# Patient Record
Sex: MALE | Race: BLACK OR AFRICAN AMERICAN | NOT HISPANIC OR LATINO | Employment: OTHER | URBAN - METROPOLITAN AREA
[De-identification: names, ages, dates, MRNs, and addresses within clinical notes are randomized per-mention and may not be internally consistent; named-entity substitution may affect disease eponyms.]

---

## 2017-01-24 RX ORDER — LISINOPRIL 10 MG/1
10 TABLET ORAL DAILY
Qty: 30 TABLET | Refills: 0 | Status: SHIPPED | OUTPATIENT
Start: 2017-01-24 | End: 2017-01-26 | Stop reason: SDUPTHER
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
## 2017-01-24 NOTE — TELEPHONE ENCOUNTER
Pt informed that he needs FU appt for additional refills and sent Lisinopril to pharmacy in [place], [place]

## 2017-01-26 ENCOUNTER — TELEPHONE (OUTPATIENT)
Dept: FAMILY MEDICINE CLINIC | Facility: CLINIC | Age: 47
End: 2017-01-26

## 2017-01-26 RX ORDER — LISINOPRIL 10 MG/1
10 TABLET ORAL DAILY
Qty: 30 TABLET | Refills: 0 | Status: SHIPPED | OUTPATIENT
Start: 2017-01-26 | End: 2017-02-15 | Stop reason: DRUGHIGH

## 2017-01-26 NOTE — TELEPHONE ENCOUNTER
----- Message from Evelyn Palomo sent at 1/26/2017 10:45 AM EST -----  Contact: [phone]  THEY DIDN'T MAKE IT TO THE [place] [facility] AND THEY WANT TO KNOW IF THEY CAN CHANGE THE PHARMACY TO ONE IN [place].     [place].      lisinopril (PRINIVIL,ZESTRIL) 10 MG tablet 30 tablet     Sig - Route: Take 1 tablet by mouth Daily. One PO daily for BP       informed pt [facility] Lisinopril  [facility] [place]

## 2017-02-14 ENCOUNTER — TELEPHONE (OUTPATIENT)
Dept: FAMILY MEDICINE CLINIC | Facility: CLINIC | Age: 47
End: 2017-02-14

## 2017-02-14 NOTE — TELEPHONE ENCOUNTER
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 Can you plz give this pt a call

## 2017-02-15 ENCOUNTER — OFFICE VISIT (OUTPATIENT)
Dept: FAMILY MEDICINE CLINIC | Facility: CLINIC | Age: 47
End: 2017-02-15

## 2017-02-15 VITALS
DIASTOLIC BLOOD PRESSURE: 86 MMHG | OXYGEN SATURATION: 98 % | BODY MASS INDEX: 34.01 KG/M2 | WEIGHT: 265 LBS | HEART RATE: 104 BPM | TEMPERATURE: 98.5 F | SYSTOLIC BLOOD PRESSURE: 166 MMHG | HEIGHT: 74 IN

## 2017-02-15 DIAGNOSIS — Z72.0 TOBACCO ABUSE: ICD-10-CM

## 2017-02-15 DIAGNOSIS — R73.9 HYPERGLYCEMIA: ICD-10-CM

## 2017-02-15 DIAGNOSIS — E66.09 NON MORBID OBESITY DUE TO EXCESS CALORIES: ICD-10-CM

## 2017-02-15 DIAGNOSIS — I10 ESSENTIAL HYPERTENSION: Primary | ICD-10-CM

## 2017-02-15 PROCEDURE — 99213 OFFICE O/P EST LOW 20 MIN: CPT | Performed by: NURSE PRACTITIONER

## 2017-02-15 RX ORDER — LISINOPRIL 20 MG/1
20 TABLET ORAL DAILY
Qty: 30 TABLET | Refills: 1 | Status: SHIPPED | OUTPATIENT
Start: 2017-02-15 | End: 2017-03-21 | Stop reason: SDUPTHER

## 2017-02-15 NOTE — PROGRESS NOTES
Subjective   Marlo Merrill is a 46 y.o. male.     History of Present Illness   Here to FU on HTN on lisinopril 10 mg daily no missed doses, with cont elevated -160s, no CP dizziness HA LE edema, with fam hx significant for heart disease, works as long distance  and working on healthy food choices, still smoking but has decreased amt now 1/2 ppd on ecig 0 nicotine, was prev erx chantix but didn't start scared of possible SE, thinks will have insurance in another 6 wks with last labs CMP 12/16 glu 122 otherwise normal  BP recheck manual R arm 150/96    The following portions of the patient's history were reviewed and updated as appropriate: allergies, current medications, past family history, past medical history, past social history, past surgical history and problem list.    Review of Systems   Constitutional: Negative for fever.   Respiratory: Negative for cough, shortness of breath and wheezing.    Cardiovascular: Negative for chest pain, palpitations and leg swelling.   Neurological: Negative for dizziness and headaches.   All other systems reviewed and are negative.      Objective   Physical Exam   Constitutional: He is oriented to person, place, and time. He appears well-developed and well-nourished.   HENT:   Head: Normocephalic and atraumatic.   Eyes: Conjunctivae and EOM are normal. Pupils are equal, round, and reactive to light.   Cardiovascular: Normal rate, regular rhythm and normal heart sounds.    Pulmonary/Chest: Effort normal and breath sounds normal.   Musculoskeletal: Normal range of motion.   Neurological: He is alert and oriented to person, place, and time.   Skin: Skin is warm and dry.   Psychiatric: He has a normal mood and affect. His behavior is normal. Judgment and thought content normal.   Vitals reviewed.      Assessment/Plan   Marlo was seen today for follow-up and hypertension.    Diagnoses and all orders for this visit:    Essential hypertension    Non morbid obesity  due to excess calories    Tobacco abuse    Hyperglycemia    Other orders  -     lisinopril (PRINIVIL,ZESTRIL) 20 MG tablet; Take 1 tablet by mouth Daily.    increase lisinopril 20 mg daily and check BP at home, log and FU 1 mo, pt should get insurance 04/17 and we will do full physical then EKG CXR full labs, enc DASH diet and gave handout, enc regular exercise to help with weight loss and BS control, enc cessation

## 2017-02-15 NOTE — PATIENT INSTRUCTIONS
"increase lisinopril 20 mg daily and check BP at home, log and FU 1 mo, pt should get insurance 04/17 and we will do full physical then EKG CXR full labs, enc DASH diet and gave handout, enc regular exercise to help with weight loss and BS control, enc cessation  DASH Eating Plan  DASH stands for \"Dietary Approaches to Stop Hypertension.\" The DASH eating plan is a healthy eating plan that has been shown to reduce high blood pressure (hypertension). Additional health benefits may include reducing the risk of type 2 diabetes mellitus, heart disease, and stroke. The DASH eating plan may also help with weight loss.  WHAT DO I NEED TO KNOW ABOUT THE DASH EATING PLAN?  For the DASH eating plan, you will follow these general guidelines:  · Choose foods with a percent daily value for sodium of less than 5% (as listed on the food label).  · Use salt-free seasonings or herbs instead of table salt or sea salt.  · Check with your health care provider or pharmacist before using salt substitutes.  · Eat lower-sodium products, often labeled as \"lower sodium\" or \"no salt added.\"  · Eat fresh foods.  · Eat more vegetables, fruits, and low-fat dairy products.  · Choose whole grains. Look for the word \"whole\" as the first word in the ingredient list.  · Choose fish and skinless chicken or turkey more often than red meat. Limit fish, poultry, and meat to 6 oz (170 g) each day.  · Limit sweets, desserts, sugars, and sugary drinks.  · Choose heart-healthy fats.  · Limit cheese to 1 oz (28 g) per day.  · Eat more home-cooked food and less restaurant, buffet, and fast food.  · Limit fried foods.  · Cook foods using methods other than frying.  · Limit canned vegetables. If you do use them, rinse them well to decrease the sodium.  · When eating at a restaurant, ask that your food be prepared with less salt, or no salt if possible.  WHAT FOODS CAN I EAT?  Seek help from a dietitian for individual calorie needs.  Grains  Whole grain or whole " wheat bread. Brown rice. Whole grain or whole wheat pasta. Quinoa, bulgur, and whole grain cereals. Low-sodium cereals. Corn or whole wheat flour tortillas. Whole grain cornbread. Whole grain crackers. Low-sodium crackers.  Vegetables  Fresh or frozen vegetables (raw, steamed, roasted, or grilled). Low-sodium or reduced-sodium tomato and vegetable juices. Low-sodium or reduced-sodium tomato sauce and paste. Low-sodium or reduced-sodium canned vegetables.   Fruits  All fresh, canned (in natural juice), or frozen fruits.  Meat and Other Protein Products  Ground beef (85% or leaner), grass-fed beef, or beef trimmed of fat. Skinless chicken or turkey. Ground chicken or turkey. Pork trimmed of fat. All fish and seafood. Eggs. Dried beans, peas, or lentils. Unsalted nuts and seeds. Unsalted canned beans.  Dairy  Low-fat dairy products, such as skim or 1% milk, 2% or reduced-fat cheeses, low-fat ricotta or cottage cheese, or plain low-fat yogurt. Low-sodium or reduced-sodium cheeses.  Fats and Oils  Tub margarines without trans fats. Light or reduced-fat mayonnaise and salad dressings (reduced sodium). Avocado. Safflower, olive, or canola oils. Natural peanut or almond butter.  Other  Unsalted popcorn and pretzels.  The items listed above may not be a complete list of recommended foods or beverages. Contact your dietitian for more options.  WHAT FOODS ARE NOT RECOMMENDED?  Grains  White bread. White pasta. White rice. Refined cornbread. Bagels and croissants. Crackers that contain trans fat.  Vegetables  Creamed or fried vegetables. Vegetables in a cheese sauce. Regular canned vegetables. Regular canned tomato sauce and paste. Regular tomato and vegetable juices.  Fruits  Dried fruits. Canned fruit in light or heavy syrup. Fruit juice.  Meat and Other Protein Products  Fatty cuts of meat. Ribs, chicken wings, narvaez, sausage, bologna, salami, chitterlings, fatback, hot dogs, bratwurst, and packaged luncheon meats. Salted  nuts and seeds. Canned beans with salt.  Dairy  Whole or 2% milk, cream, half-and-half, and cream cheese. Whole-fat or sweetened yogurt. Full-fat cheeses or blue cheese. Nondairy creamers and whipped toppings. Processed cheese, cheese spreads, or cheese curds.  Condiments  Onion and garlic salt, seasoned salt, table salt, and sea salt. Canned and packaged gravies. Worcestershire sauce. Tartar sauce. Barbecue sauce. Teriyaki sauce. Soy sauce, including reduced sodium. Steak sauce. Fish sauce. Oyster sauce. Cocktail sauce. Horseradish. Ketchup and mustard. Meat flavorings and tenderizers. Bouillon cubes. Hot sauce. Tabasco sauce. Marinades. Taco seasonings. Relishes.  Fats and Oils  Butter, stick margarine, lard, shortening, ghee, and narvaez fat. Coconut, palm kernel, or palm oils. Regular salad dressings.  Other  Pickles and olives. Salted popcorn and pretzels.  The items listed above may not be a complete list of foods and beverages to avoid. Contact your dietitian for more information.  WHERE CAN I FIND MORE INFORMATION?  National Heart, [employer], and [employer] [place]: www.nhlbi.nih.gov/health/health-topics/topics/dash/     This information is not intended to replace advice given to you by your health care provider. Make sure you discuss any questions you have with your health care provider.     Document Released: 12/06/2012 Document Revised: 01/08/2016 Document Reviewed: 10/22/2014  Elsevier Interactive Patient Education ©2016 [employer]

## 2017-03-21 RX ORDER — LISINOPRIL 20 MG/1
20 TABLET ORAL DAILY
Qty: 30 TABLET | Refills: 1 | Status: SHIPPED | OUTPATIENT
Start: 2017-03-21 | End: 2017-05-10 | Stop reason: SDUPTHER

## 2017-05-10 RX ORDER — LISINOPRIL 20 MG/1
20 TABLET ORAL DAILY
Qty: 30 TABLET | Refills: 1 | Status: SHIPPED | OUTPATIENT
Start: 2017-05-10 | End: 2017-05-10 | Stop reason: SDUPTHER

## 2017-05-10 RX ORDER — LISINOPRIL 20 MG/1
20 TABLET ORAL DAILY
Qty: 30 TABLET | Refills: 1 | Status: SHIPPED | OUTPATIENT
Start: 2017-05-10 | End: 2017-08-10 | Stop reason: SDUPTHER

## 2017-06-23 ENCOUNTER — TELEPHONE (OUTPATIENT)
Dept: FAMILY MEDICINE CLINIC | Facility: CLINIC | Age: 47
End: 2017-06-23

## 2017-06-23 NOTE — TELEPHONE ENCOUNTER
PT NEEDS A REFILL ON HIS BP MEDICINE LISINOPRIL 20MG. PT WILL CALL US Monday TO LET US KNOW WHICH PHARMACY AND PT WANTS A 3 MONTH SUPPLY.

## 2017-06-27 ENCOUNTER — TELEPHONE (OUTPATIENT)
Dept: FAMILY MEDICINE CLINIC | Facility: CLINIC | Age: 47
End: 2017-06-27

## 2017-08-01 ENCOUNTER — TELEPHONE (OUTPATIENT)
Dept: FAMILY MEDICINE CLINIC | Facility: CLINIC | Age: 47
End: 2017-08-01

## 2017-08-01 NOTE — TELEPHONE ENCOUNTER
Spoke with patient. He wont need refills until next week. He will call back next week with pharmacy

## 2017-08-01 NOTE — TELEPHONE ENCOUNTER
REFILL ON lisinopril (PRINIVIL,ZESTRIL) 20 MG tablet  Take 1 tablet by mouth DaiLY    PATIENT WILL CALL BACK WITH PHARMACY IT NEEDS TO GO TO

## 2017-08-10 ENCOUNTER — TELEPHONE (OUTPATIENT)
Dept: FAMILY MEDICINE CLINIC | Facility: CLINIC | Age: 47
End: 2017-08-10

## 2017-08-10 RX ORDER — LISINOPRIL 20 MG/1
20 TABLET ORAL DAILY
Qty: 90 TABLET | Refills: 0 | Status: SHIPPED | OUTPATIENT
Start: 2017-08-10 | End: 2018-02-08 | Stop reason: SDUPTHER

## 2017-11-21 ENCOUNTER — TELEPHONE (OUTPATIENT)
Dept: FAMILY MEDICINE CLINIC | Facility: CLINIC | Age: 47
End: 2017-11-21

## 2017-11-27 ENCOUNTER — TELEPHONE (OUTPATIENT)
Dept: FAMILY MEDICINE CLINIC | Facility: CLINIC | Age: 47
End: 2017-11-27

## 2017-11-28 RX ORDER — LISINOPRIL 20 MG/1
20 TABLET ORAL DAILY
Qty: 30 TABLET | Refills: 0 | Status: SHIPPED | OUTPATIENT
Start: 2017-11-28 | End: 2018-02-08 | Stop reason: SDUPTHER

## 2018-02-07 ENCOUNTER — TELEPHONE (OUTPATIENT)
Dept: FAMILY MEDICINE CLINIC | Facility: CLINIC | Age: 48
End: 2018-02-07
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
## 2018-02-08 ENCOUNTER — TELEPHONE (OUTPATIENT)
Dept: FAMILY MEDICINE CLINIC | Facility: CLINIC | Age: 48
End: 2018-02-08

## 2018-02-08 RX ORDER — LISINOPRIL 20 MG/1
20 TABLET ORAL DAILY
Qty: 30 TABLET | Refills: 0 | Status: SHIPPED | OUTPATIENT
Start: 2018-02-08 | End: 2018-04-09 | Stop reason: SDUPTHER

## 2018-02-08 RX ORDER — LISINOPRIL 20 MG/1
20 TABLET ORAL DAILY
Qty: 30 TABLET | Refills: 0 | Status: SHIPPED | OUTPATIENT
Start: 2018-02-08 | End: 2018-03-14 | Stop reason: SDUPTHER
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
## 2018-03-13 ENCOUNTER — TELEPHONE (OUTPATIENT)
Dept: FAMILY MEDICINE CLINIC | Facility: CLINIC | Age: 48
End: 2018-03-13

## 2018-03-13 NOTE — TELEPHONE ENCOUNTER
He has had 7 cancellations and 2 no shows, he needs to make apt and keep, he is overdue fasting labs and must be seen regularly to refill HTN meds as it can hurt his kidney, ok to give 2 wk supply

## 2018-03-14 RX ORDER — LISINOPRIL 20 MG/1
20 TABLET ORAL DAILY
Qty: 14 TABLET | Refills: 0 | Status: SHIPPED | OUTPATIENT
Start: 2018-03-14 | End: 2018-04-09 | Stop reason: SDUPTHER

## 2018-04-09 ENCOUNTER — OFFICE VISIT (OUTPATIENT)
Dept: FAMILY MEDICINE CLINIC | Facility: CLINIC | Age: 48
End: 2018-04-09

## 2018-04-09 ENCOUNTER — TELEPHONE (OUTPATIENT)
Dept: FAMILY MEDICINE CLINIC | Facility: CLINIC | Age: 48
End: 2018-04-09

## 2018-04-09 VITALS
DIASTOLIC BLOOD PRESSURE: 84 MMHG | HEART RATE: 97 BPM | WEIGHT: 273 LBS | TEMPERATURE: 99 F | HEIGHT: 74 IN | BODY MASS INDEX: 35.04 KG/M2 | SYSTOLIC BLOOD PRESSURE: 118 MMHG | OXYGEN SATURATION: 96 %

## 2018-04-09 DIAGNOSIS — IMO0001 CLASS 2 OBESITY DUE TO EXCESS CALORIES WITH SERIOUS COMORBIDITY AND BODY MASS INDEX (BMI) OF 35.0 TO 35.9 IN ADULT: ICD-10-CM

## 2018-04-09 DIAGNOSIS — I10 ESSENTIAL HYPERTENSION: Primary | ICD-10-CM

## 2018-04-09 LAB
ALBUMIN SERPL-MCNC: 4.2 G/DL (ref 3.5–5.2)
ALBUMIN/GLOB SERPL: 1.3 G/DL
ALP SERPL-CCNC: 93 U/L (ref 39–117)
ALT SERPL W P-5'-P-CCNC: 23 U/L (ref 1–41)
ANION GAP SERPL CALCULATED.3IONS-SCNC: 10.5 MMOL/L
AST SERPL-CCNC: 15 U/L (ref 1–40)
BILIRUB SERPL-MCNC: 0.2 MG/DL (ref 0.1–1.2)
BUN BLD-MCNC: 12 MG/DL (ref 6–20)
BUN/CREAT SERPL: 9.9 (ref 7–25)
CALCIUM SPEC-SCNC: 9.4 MG/DL (ref 8.6–10.5)
CHLORIDE SERPL-SCNC: 104 MMOL/L (ref 98–107)
CHOLEST SERPL-MCNC: 213 MG/DL (ref 0–200)
CO2 SERPL-SCNC: 26.5 MMOL/L (ref 22–29)
CREAT BLD-MCNC: 1.21 MG/DL (ref 0.76–1.27)
GFR SERPL CREATININE-BSD FRML MDRD: 78 ML/MIN/1.73
GLOBULIN UR ELPH-MCNC: 3.3 GM/DL
GLUCOSE BLD-MCNC: 117 MG/DL (ref 65–99)
HDLC SERPL-MCNC: 28 MG/DL (ref 40–60)
LDLC SERPL CALC-MCNC: 147 MG/DL (ref 0–100)
LDLC/HDLC SERPL: 5.26 {RATIO}
POTASSIUM BLD-SCNC: 4.4 MMOL/L (ref 3.5–5.2)
PROT SERPL-MCNC: 7.5 G/DL (ref 6–8.5)
SODIUM BLD-SCNC: 141 MMOL/L (ref 136–145)
TRIGL SERPL-MCNC: 188 MG/DL (ref 0–150)
VLDLC SERPL-MCNC: 37.6 MG/DL (ref 5–40)

## 2018-04-09 PROCEDURE — 99213 OFFICE O/P EST LOW 20 MIN: CPT | Performed by: NURSE PRACTITIONER

## 2018-04-09 PROCEDURE — 80061 LIPID PANEL: CPT | Performed by: FAMILY MEDICINE

## 2018-04-09 PROCEDURE — 80053 COMPREHEN METABOLIC PANEL: CPT | Performed by: FAMILY MEDICINE

## 2018-04-09 PROCEDURE — 36415 COLL VENOUS BLD VENIPUNCTURE: CPT | Performed by: NURSE PRACTITIONER

## 2018-04-09 RX ORDER — LISINOPRIL 20 MG/1
20 TABLET ORAL DAILY
Qty: 90 TABLET | Refills: 3 | Status: SHIPPED | OUTPATIENT
Start: 2018-04-09 | End: 2018-08-09 | Stop reason: SDUPTHER

## 2018-04-09 NOTE — PATIENT INSTRUCTIONS
check labs today and call with results, refill lisinopril 20 mg daily and check BP at home, congratulated on smoking cessation, Enc healthy diet and regular exercise for wt loss, RTC next mo with insurance will get complete physical

## 2018-04-09 NOTE — TELEPHONE ENCOUNTER
BS elevated 117, recommend healthy diet and regular exercise for wt loss and BS control. We can recheck at  apt annual physical when you get insurance. Kidney and liver normal. Chol elevated  goal < 100, again recommend healthy diet and exercise and we can recheck if still high consider medication    ----- Message from Eloy De Leon MD sent at 4/9/2018 11:13 AM EDT -----      ----- Message -----  From: Lab, Background User  Sent: 4/9/2018  10:29 AM  To: Eloy De Leon MD

## 2018-04-09 NOTE — PROGRESS NOTES
Subjective   Marlo Merrill is a 47 y.o. male.     History of Present Illness   Here to FU on HTN on lisinopril 20 mg daily no CP dizziness HA LE edema, stopped smoking 01/03/18 no coughing wheezing or SOA, fasting today for labs, no regular BP checks at home, no fam hx of prostate or colon ca, working on getting insurance in next few mo    The following portions of the patient's history were reviewed and updated as appropriate: allergies, current medications, past family history, past medical history, past social history, past surgical history and problem list.    Review of Systems   Constitutional: Negative for fever.   Respiratory: Negative for cough, shortness of breath and wheezing.    Cardiovascular: Negative for chest pain, palpitations and leg swelling.   Neurological: Negative for dizziness and headaches.   All other systems reviewed and are negative.      Objective   Physical Exam   Constitutional: He is oriented to person, place, and time. He appears well-developed and well-nourished.   HENT:   Head: Normocephalic and atraumatic.   Eyes: Conjunctivae and EOM are normal. Pupils are equal, round, and reactive to light.   Cardiovascular: Normal rate, regular rhythm and normal heart sounds.    Pulmonary/Chest: Effort normal and breath sounds normal.   Musculoskeletal: Normal range of motion.   Neurological: He is alert and oriented to person, place, and time.   Skin: Skin is warm and dry.   Psychiatric: He has a normal mood and affect. His behavior is normal. Judgment and thought content normal.   Vitals reviewed.      Assessment/Plan   Marlo was seen today for annual exam.    Diagnoses and all orders for this visit:    Essential hypertension  -     Comprehensive Metabolic Panel  -     Lipid Panel    Class 2 obesity due to excess calories with serious comorbidity and body mass index (BMI) of 35.0 to 35.9 in adult    Other orders  -     lisinopril (PRINIVIL,ZESTRIL) 20 MG tablet; Take 1 tablet by mouth  Daily.    check labs today and call with results, refill lisinopril 20 mg daily and check BP at home, congratulated on smoking cessation, Enc healthy diet and regular exercise for wt loss, RTC next mo with insurance will get complete physical

## 2018-08-09 ENCOUNTER — TELEPHONE (OUTPATIENT)
Dept: FAMILY MEDICINE CLINIC | Facility: CLINIC | Age: 48
End: 2018-08-09

## 2018-08-09 RX ORDER — LISINOPRIL 20 MG/1
20 TABLET ORAL DAILY
Qty: 90 TABLET | Refills: 3 | Status: SHIPPED | OUTPATIENT
Start: 2018-08-09 | End: 2019-01-18 | Stop reason: SDUPTHER

## 2019-01-18 ENCOUNTER — TELEPHONE (OUTPATIENT)
Dept: FAMILY MEDICINE CLINIC | Facility: CLINIC | Age: 49
End: 2019-01-18

## 2019-01-18 RX ORDER — LISINOPRIL 20 MG/1
20 TABLET ORAL DAILY
Qty: 90 TABLET | Refills: 1 | Status: SHIPPED | OUTPATIENT
Start: 2019-01-18 | End: 2019-01-18 | Stop reason: SDUPTHER

## 2019-01-18 RX ORDER — LISINOPRIL 20 MG/1
20 TABLET ORAL DAILY
Qty: 90 TABLET | Refills: 1 | Status: SHIPPED | OUTPATIENT
Start: 2019-01-18 | End: 2019-04-22 | Stop reason: SDUPTHER

## 2019-03-29 ENCOUNTER — TELEPHONE (OUTPATIENT)
Dept: FAMILY MEDICINE CLINIC | Facility: CLINIC | Age: 49
End: 2019-03-29

## 2019-04-22 ENCOUNTER — TELEPHONE (OUTPATIENT)
Dept: FAMILY MEDICINE CLINIC | Facility: CLINIC | Age: 49
End: 2019-04-22

## 2019-04-22 RX ORDER — LISINOPRIL 20 MG/1
20 TABLET ORAL DAILY
Qty: 90 TABLET | Refills: 1 | Status: SHIPPED | OUTPATIENT
Start: 2019-04-22 | End: 2019-07-22 | Stop reason: SDUPTHER

## 2019-04-22 NOTE — TELEPHONE ENCOUNTER
REFILL ON lisinopril (PRINIVIL,ZESTRIL) 20 MG tablet      [facility]  [phone]      CALL PATIENT WHEN THIS IS SENT IN

## 2019-07-22 ENCOUNTER — TELEPHONE (OUTPATIENT)
Dept: FAMILY MEDICINE CLINIC | Facility: CLINIC | Age: 49
End: 2019-07-22

## 2019-07-22 RX ORDER — LISINOPRIL 20 MG/1
20 TABLET ORAL DAILY
Qty: 90 TABLET | Refills: 1 | Status: SHIPPED | OUTPATIENT
Start: 2019-07-22 | End: 2019-12-16 | Stop reason: SDUPTHER

## 2019-10-24 ENCOUNTER — TELEPHONE (OUTPATIENT)
Dept: FAMILY MEDICINE CLINIC | Facility: CLINIC | Age: 49
End: 2019-10-24

## 2019-12-16 ENCOUNTER — OFFICE VISIT (OUTPATIENT)
Dept: FAMILY MEDICINE CLINIC | Facility: CLINIC | Age: 49
End: 2019-12-16

## 2019-12-16 VITALS
SYSTOLIC BLOOD PRESSURE: 144 MMHG | TEMPERATURE: 98.1 F | BODY MASS INDEX: 33.88 KG/M2 | HEIGHT: 74 IN | DIASTOLIC BLOOD PRESSURE: 88 MMHG | OXYGEN SATURATION: 97 % | WEIGHT: 264 LBS | HEART RATE: 100 BPM

## 2019-12-16 DIAGNOSIS — E66.09 CLASS 1 OBESITY DUE TO EXCESS CALORIES WITH SERIOUS COMORBIDITY AND BODY MASS INDEX (BMI) OF 33.0 TO 33.9 IN ADULT: ICD-10-CM

## 2019-12-16 DIAGNOSIS — Z72.0 TOBACCO ABUSE: ICD-10-CM

## 2019-12-16 DIAGNOSIS — E78.5 HYPERLIPIDEMIA, UNSPECIFIED HYPERLIPIDEMIA TYPE: ICD-10-CM

## 2019-12-16 DIAGNOSIS — I10 ESSENTIAL HYPERTENSION: Primary | ICD-10-CM

## 2019-12-16 PROCEDURE — 99213 OFFICE O/P EST LOW 20 MIN: CPT | Performed by: NURSE PRACTITIONER

## 2019-12-16 RX ORDER — LISINOPRIL 20 MG/1
20 TABLET ORAL DAILY
Qty: 90 TABLET | Refills: 1 | Status: SHIPPED | OUTPATIENT
Start: 2019-12-16 | End: 2020-05-14 | Stop reason: SDUPTHER

## 2019-12-16 NOTE — PROGRESS NOTES
Subjective   Marlo Merrill is a 49 y.o. male.     History of Present Illness   Here to FU on chronic HTN on lisinopril 20 mg daily no CP dizziness HA LE edema, restarted smoking cigarettes with increased stress work and family but wants to quit, no coughing wheezing or SOA, doesn't check BP at home, nonfasting today for labs last chol 04/18 elevated, working on healthy diet and weight loss 9 lbs since last OV 04/18, no fam hx of prostate or colon ca and no insurance defer today    The following portions of the patient's history were reviewed and updated as appropriate: allergies, current medications, past family history, past medical history, past social history, past surgical history and problem list.    Review of Systems   Constitutional: Negative for fever.   Respiratory: Negative for cough, shortness of breath and wheezing.    Cardiovascular: Negative for chest pain, palpitations and leg swelling.   Neurological: Negative for dizziness and headaches.   All other systems reviewed and are negative.      Objective   Physical Exam   Constitutional: He is oriented to person, place, and time. He appears well-developed and well-nourished.   HENT:   Head: Normocephalic and atraumatic.   Eyes: Pupils are equal, round, and reactive to light. Conjunctivae and EOM are normal.   Cardiovascular: Normal rate, regular rhythm and normal heart sounds.   Pulmonary/Chest: Effort normal and breath sounds normal.   Musculoskeletal: Normal range of motion. He exhibits no edema (B LE).   Neurological: He is alert and oriented to person, place, and time.   Skin: Skin is warm and dry.   Psychiatric: He has a normal mood and affect. His behavior is normal. Judgment and thought content normal.   Vitals reviewed.      Assessment/Plan   Marlo was seen today for follow-up.    Diagnoses and all orders for this visit:    Essential hypertension  -     Comprehensive Metabolic Panel  -     Lipid Panel    Class 1 obesity due to excess calories  with serious comorbidity and body mass index (BMI) of 33.0 to 33.9 in adult    Tobacco abuse    Hyperlipidemia, unspecified hyperlipidemia type    Other orders  -     lisinopril (PRINIVIL,ZESTRIL) 20 MG tablet; Take 1 tablet by mouth Daily.    RTC fasting check labs and call with results, refill lisinopril 20 mg daily and check BP at home consider dose adjustment if remains elevated, enc smoking cessation, Enc healthy diet and regular exercise for wt loss, defer PSA or colonoscopy enc to get insurance in the new year

## 2019-12-16 NOTE — PATIENT INSTRUCTIONS
RTC fasting check labs and call with results, refill lisinopril 20 mg daily and check BP at home consider dose adjustment if remains elevated, enc smoking cessation, Enc healthy diet and regular exercise for wt loss, defer PSA or colonoscopy enc to get insurance in the new year

## 2020-05-14 ENCOUNTER — TELEPHONE (OUTPATIENT)
Dept: FAMILY MEDICINE CLINIC | Facility: CLINIC | Age: 50
End: 2020-05-14

## 2020-05-14 RX ORDER — LISINOPRIL 20 MG/1
20 TABLET ORAL DAILY
Qty: 90 TABLET | Refills: 1 | Status: SHIPPED | OUTPATIENT
Start: 2020-05-14 | End: 2020-06-09 | Stop reason: HOSPADM

## 2020-05-14 RX ORDER — LISINOPRIL 20 MG/1
20 TABLET ORAL DAILY
Qty: 90 TABLET | Refills: 1 | Status: SHIPPED | OUTPATIENT
Start: 2020-05-14 | End: 2020-05-14 | Stop reason: SDUPTHER

## 2020-05-14 NOTE — TELEPHONE ENCOUNTER
PATIENT WIFE STATES THAT THE MEDICINE lisinopril (PRINIVIL,ZESTRIL) 20 MG tablet WAS SENT TO THE WRONG PHARAMCY. THE MEDICINE SHOULD HAVE BEEN SENT TO THE [facility] [address] [place] [address] ([place] [address] [facility] [phone] [facility] [phone] FX. PLEASE ADVISE

## 2020-06-05 ENCOUNTER — TELEPHONE (OUTPATIENT)
Dept: FAMILY MEDICINE CLINIC | Facility: CLINIC | Age: 50
End: 2020-06-05

## 2020-06-05 ENCOUNTER — APPOINTMENT (OUTPATIENT)
Dept: GENERAL RADIOLOGY | Facility: HOSPITAL | Age: 50
End: 2020-06-05

## 2020-06-05 ENCOUNTER — APPOINTMENT (OUTPATIENT)
Dept: CT IMAGING | Facility: HOSPITAL | Age: 50
End: 2020-06-05

## 2020-06-05 ENCOUNTER — HOSPITAL ENCOUNTER (INPATIENT)
Facility: HOSPITAL | Age: 50
LOS: 1 days | Discharge: HOME | End: 2020-06-09
Attending: EMERGENCY MEDICINE | Admitting: HOSPITALIST

## 2020-06-05 DIAGNOSIS — J90 BILATERAL PLEURAL EFFUSION: ICD-10-CM

## 2020-06-05 DIAGNOSIS — R59.0 MEDIASTINAL LYMPHADENOPATHY: ICD-10-CM

## 2020-06-05 DIAGNOSIS — R79.89 ELEVATED BRAIN NATRIURETIC PEPTIDE (BNP) LEVEL: ICD-10-CM

## 2020-06-05 DIAGNOSIS — R06.00 DYSPNEA, UNSPECIFIED TYPE: Primary | ICD-10-CM

## 2020-06-05 PROBLEM — R59.9 LYMPH NODES ENLARGED: Status: ACTIVE | Noted: 2020-06-05

## 2020-06-05 PROBLEM — R06.01 ORTHOPNEA: Status: ACTIVE | Noted: 2020-06-05

## 2020-06-05 PROBLEM — I50.9 CONGESTIVE HEART FAILURE (CHF): Status: ACTIVE | Noted: 2020-06-05

## 2020-06-05 PROBLEM — M79.89 LEG SWELLING: Status: ACTIVE | Noted: 2020-06-05

## 2020-06-05 LAB
ALBUMIN SERPL-MCNC: 4.5 G/DL (ref 3.5–5.2)
ALBUMIN/GLOB SERPL: 1.5 G/DL
ALP SERPL-CCNC: 77 U/L (ref 39–117)
ALT SERPL W P-5'-P-CCNC: 55 U/L (ref 1–41)
ANION GAP SERPL CALCULATED.3IONS-SCNC: 6.9 MMOL/L (ref 5–15)
AST SERPL-CCNC: 29 U/L (ref 1–40)
BACTERIA UR QL AUTO: NORMAL /HPF
BASOPHILS # BLD AUTO: 0.03 10*3/MM3 (ref 0–0.2)
BASOPHILS NFR BLD AUTO: 0.3 % (ref 0–1.5)
BILIRUB SERPL-MCNC: 0.6 MG/DL (ref 0.2–1.2)
BILIRUB UR QL STRIP: NEGATIVE
BUN BLD-MCNC: 16 MG/DL (ref 6–20)
BUN/CREAT SERPL: 13.3 (ref 7–25)
CALCIUM SPEC-SCNC: 9.4 MG/DL (ref 8.6–10.5)
CHLORIDE SERPL-SCNC: 107 MMOL/L (ref 98–107)
CLARITY UR: CLEAR
CO2 SERPL-SCNC: 26.1 MMOL/L (ref 22–29)
COLOR UR: YELLOW
CREAT BLD-MCNC: 1.2 MG/DL (ref 0.76–1.27)
D DIMER PPP FEU-MCNC: 1.81 MCGFEU/ML (ref 0–0.49)
DEPRECATED RDW RBC AUTO: 45.4 FL (ref 37–54)
EOSINOPHIL # BLD AUTO: 0.07 10*3/MM3 (ref 0–0.4)
EOSINOPHIL NFR BLD AUTO: 0.8 % (ref 0.3–6.2)
ERYTHROCYTE [DISTWIDTH] IN BLOOD BY AUTOMATED COUNT: 15.2 % (ref 12.3–15.4)
GFR SERPL CREATININE-BSD FRML MDRD: 77 ML/MIN/1.73
GLOBULIN UR ELPH-MCNC: 3.1 GM/DL
GLUCOSE BLD-MCNC: 118 MG/DL (ref 65–99)
GLUCOSE UR STRIP-MCNC: NEGATIVE MG/DL
HCT VFR BLD AUTO: 42.9 % (ref 37.5–51)
HGB BLD-MCNC: 13.9 G/DL (ref 13–17.7)
HGB UR QL STRIP.AUTO: ABNORMAL
HYALINE CASTS UR QL AUTO: NORMAL /LPF
IMM GRANULOCYTES # BLD AUTO: 0.02 10*3/MM3 (ref 0–0.05)
IMM GRANULOCYTES NFR BLD AUTO: 0.2 % (ref 0–0.5)
INR PPP: 1.08 (ref 0.9–1.1)
KETONES UR QL STRIP: NEGATIVE
LEUKOCYTE ESTERASE UR QL STRIP.AUTO: NEGATIVE
LYMPHOCYTES # BLD AUTO: 3.4 10*3/MM3 (ref 0.7–3.1)
LYMPHOCYTES NFR BLD AUTO: 37.8 % (ref 19.6–45.3)
MCH RBC QN AUTO: 26.6 PG (ref 26.6–33)
MCHC RBC AUTO-ENTMCNC: 32.4 G/DL (ref 31.5–35.7)
MCV RBC AUTO: 82.2 FL (ref 79–97)
MONOCYTES # BLD AUTO: 0.87 10*3/MM3 (ref 0.1–0.9)
MONOCYTES NFR BLD AUTO: 9.7 % (ref 5–12)
NEUTROPHILS # BLD AUTO: 4.61 10*3/MM3 (ref 1.7–7)
NEUTROPHILS NFR BLD AUTO: 51.2 % (ref 42.7–76)
NITRITE UR QL STRIP: NEGATIVE
NRBC BLD AUTO-RTO: 0 /100 WBC (ref 0–0.2)
NT-PROBNP SERPL-MCNC: 3754 PG/ML (ref 5–900)
PH UR STRIP.AUTO: <=5 [PH] (ref 5–8)
PLATELET # BLD AUTO: 343 10*3/MM3 (ref 140–450)
PMV BLD AUTO: 11.1 FL (ref 6–12)
POTASSIUM BLD-SCNC: 3.8 MMOL/L (ref 3.5–5.2)
PROT SERPL-MCNC: 7.6 G/DL (ref 6–8.5)
PROT UR QL STRIP: ABNORMAL
PROTHROMBIN TIME: 13.7 SECONDS (ref 11.7–14.2)
RBC # BLD AUTO: 5.22 10*6/MM3 (ref 4.14–5.8)
RBC # UR: NORMAL /HPF
REF LAB TEST METHOD: NORMAL
SODIUM BLD-SCNC: 140 MMOL/L (ref 136–145)
SP GR UR STRIP: 1.02 (ref 1–1.03)
SQUAMOUS #/AREA URNS HPF: NORMAL /HPF
TROPONIN T SERPL-MCNC: <0.01 NG/ML (ref 0–0.03)
UROBILINOGEN UR QL STRIP: ABNORMAL
WBC NRBC COR # BLD: 9 10*3/MM3 (ref 3.4–10.8)
WBC UR QL AUTO: NORMAL /HPF

## 2020-06-05 PROCEDURE — 93005 ELECTROCARDIOGRAM TRACING: CPT

## 2020-06-05 PROCEDURE — G0378 HOSPITAL OBSERVATION PER HR: HCPCS

## 2020-06-05 PROCEDURE — 93005 ELECTROCARDIOGRAM TRACING: CPT | Performed by: EMERGENCY MEDICINE

## 2020-06-05 PROCEDURE — 80053 COMPREHEN METABOLIC PANEL: CPT | Performed by: NURSE PRACTITIONER

## 2020-06-05 PROCEDURE — 99284 EMERGENCY DEPT VISIT MOD MDM: CPT

## 2020-06-05 PROCEDURE — 84484 ASSAY OF TROPONIN QUANT: CPT | Performed by: NURSE PRACTITIONER

## 2020-06-05 PROCEDURE — 93010 ELECTROCARDIOGRAM REPORT: CPT | Performed by: INTERNAL MEDICINE

## 2020-06-05 PROCEDURE — 25010000002 FUROSEMIDE PER 20 MG: Performed by: NURSE PRACTITIONER

## 2020-06-05 PROCEDURE — 0 IOPAMIDOL PER 1 ML: Performed by: EMERGENCY MEDICINE

## 2020-06-05 PROCEDURE — 85379 FIBRIN DEGRADATION QUANT: CPT | Performed by: NURSE PRACTITIONER

## 2020-06-05 PROCEDURE — 71275 CT ANGIOGRAPHY CHEST: CPT

## 2020-06-05 PROCEDURE — 85610 PROTHROMBIN TIME: CPT | Performed by: NURSE PRACTITIONER

## 2020-06-05 PROCEDURE — 81001 URINALYSIS AUTO W/SCOPE: CPT | Performed by: NURSE PRACTITIONER

## 2020-06-05 PROCEDURE — 83880 ASSAY OF NATRIURETIC PEPTIDE: CPT | Performed by: NURSE PRACTITIONER

## 2020-06-05 PROCEDURE — 71045 X-RAY EXAM CHEST 1 VIEW: CPT

## 2020-06-05 PROCEDURE — 85025 COMPLETE CBC W/AUTO DIFF WBC: CPT | Performed by: NURSE PRACTITIONER

## 2020-06-05 RX ORDER — NITROGLYCERIN 0.4 MG/1
0.4 TABLET SUBLINGUAL
Status: DISCONTINUED | OUTPATIENT
Start: 2020-06-05 | End: 2020-06-09 | Stop reason: HOSPADM

## 2020-06-05 RX ORDER — SODIUM CHLORIDE 0.9 % (FLUSH) 0.9 %
10 SYRINGE (ML) INJECTION AS NEEDED
Status: DISCONTINUED | OUTPATIENT
Start: 2020-06-05 | End: 2020-06-06

## 2020-06-05 RX ORDER — FUROSEMIDE 10 MG/ML
20 INJECTION INTRAMUSCULAR; INTRAVENOUS ONCE
Status: COMPLETED | OUTPATIENT
Start: 2020-06-05 | End: 2020-06-05

## 2020-06-05 RX ORDER — SODIUM CHLORIDE 0.9 % (FLUSH) 0.9 %
10 SYRINGE (ML) INJECTION EVERY 12 HOURS SCHEDULED
Status: DISCONTINUED | OUTPATIENT
Start: 2020-06-05 | End: 2020-06-06

## 2020-06-05 RX ORDER — FUROSEMIDE 10 MG/ML
40 INJECTION INTRAMUSCULAR; INTRAVENOUS EVERY 12 HOURS
Status: DISCONTINUED | OUTPATIENT
Start: 2020-06-05 | End: 2020-06-06

## 2020-06-05 RX ADMIN — METOPROLOL TARTRATE 5 MG: 5 INJECTION, SOLUTION INTRAVENOUS at 18:00

## 2020-06-05 RX ADMIN — IOPAMIDOL 100 ML: 755 INJECTION, SOLUTION INTRAVENOUS at 19:15

## 2020-06-05 RX ADMIN — FUROSEMIDE 20 MG: 10 INJECTION, SOLUTION INTRAMUSCULAR; INTRAVENOUS at 20:03

## 2020-06-05 NOTE — ED PROVIDER NOTES
"EMERGENCY DEPARTMENT ENCOUNTER    Room Number:  03/03  Date seen:  6/5/2020  Time seen: 3:53 PM  PCP: Kaya Park APRN  Historian: patient    HPI:  Chief complaint:SOA, dyspnea  A complete HPI/ROS/PMH/PSH/SH/FH are unobtainable due to: n/a  Context:Marlo Merrill is a 50 y.o. male who presents to the ED with c/o 3 weeks of moderately progressive SOA, worse when lying flat.  It is made better by keeping himself elevated.  He also has BLE edema, states this is worse due to fact he cannot sleep lying flat and instead sleeps in upright reclined position.  The symptoms are not made worse or better by anything.  He works as over the road  and states that the past month or so he is really been \"pushing it\".  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  He states he also uses 5-hour energy drinks and coffee to help him stay awake and get through driving.  He does take lisinopril for his hypertension.  He denies chest pain, chest pressure or chest tightness and stop smoking about 1 week ago.  He did try to call his primary care today and was referred to the ER and they will see him next week in follow-up to this ER visit.  He has no history of blood clots that he knows of and no history of any malignancies.    Patient was placed in face mask in first look. Patient was wearing facemask when I entered the room and throughout our encounter. I wore full protective equipment throughout this patient encounter including a face mask, eye shield and gloves. Hand hygiene/washing of hands was performed before donning protective equipment and after removal when leaving the room.      MEDICAL RECORD REVIEW      ALLERGIES  Patient has no known allergies.    PAST MEDICAL HISTORY  Active Ambulatory Problems     Diagnosis Date Noted   • Hypertension 02/15/2017     Resolved Ambulatory Problems     Diagnosis Date Noted   • No Resolved Ambulatory Problems     No Additional Past Medical History       PAST SURGICAL " HISTORY  History reviewed. No pertinent surgical history.    FAMILY HISTORY  Family History   Problem Relation Age of Onset   • Hypertension Mother    • Heart disease Father         stent   • No Known Problems Sister    • No Known Problems Brother    • No Known Problems Maternal Grandmother    • No Known Problems Maternal Grandfather    • No Known Problems Paternal Grandmother    • No Known Problems Paternal Grandfather        SOCIAL HISTORY  Social History     Socioeconomic History   • Marital status:      Spouse name: Not on file   • Number of children: Not on file   • Years of education: Not on file   • Highest education level: Not on file   Tobacco Use   • Smoking status: Former Smoker     Types: Cigarettes   • Smokeless tobacco: Never Used   Substance and Sexual Activity   • Alcohol use: Yes   • Drug use: No       REVIEW OF SYSTEMS  Review of Systems    All systems reviewed and negative except for those discussed in HPI.     PHYSICAL EXAM    ED Triage Vitals [06/05/20 1529]   Temp Heart Rate Resp BP SpO2   98.8 °F (37.1 °C) 103 22 -- 96 %      Temp src Heart Rate Source Patient Position BP Location FiO2 (%)   Tympanic Monitor -- -- --     Physical Exam    I have reviewed the triage vital signs and nursing notes.      GENERAL: slightly anxious, eager  HENT: nares patent, mm moist  EYES: no scleral icterus  NECK: no ROM limitations  CV: regular rhythm, sinus tachycardia, no murmur, rub or ying  RESPIRATORY: normal effort, diminished in bases, no rales, rhonchi or wheezing  ABDOMEN: soft, large and rounded  : deferred  MUSCULOSKELETAL: no deformity, BLE edema 2+  NEURO: alert, moves all extremities, follows commands  SKIN: warm, dry    LAB RESULTS  Recent Results (from the past 24 hour(s))   Protime-INR    Collection Time: 06/05/20  4:07 PM   Result Value Ref Range    Protime 13.7 11.7 - 14.2 Seconds    INR 1.08 0.90 - 1.10   CBC Auto Differential    Collection Time: 06/05/20  4:07 PM   Result Value Ref  Range    WBC 9.00 3.40 - 10.80 10*3/mm3    RBC 5.22 4.14 - 5.80 10*6/mm3    Hemoglobin 13.9 13.0 - 17.7 g/dL    Hematocrit 42.9 37.5 - 51.0 %    MCV 82.2 79.0 - 97.0 fL    MCH 26.6 26.6 - 33.0 pg    MCHC 32.4 31.5 - 35.7 g/dL    RDW 15.2 12.3 - 15.4 %    RDW-SD 45.4 37.0 - 54.0 fl    MPV 11.1 6.0 - 12.0 fL    Platelets 343 140 - 450 10*3/mm3    Neutrophil % 51.2 42.7 - 76.0 %    Lymphocyte % 37.8 19.6 - 45.3 %    Monocyte % 9.7 5.0 - 12.0 %    Eosinophil % 0.8 0.3 - 6.2 %    Basophil % 0.3 0.0 - 1.5 %    Immature Grans % 0.2 0.0 - 0.5 %    Neutrophils, Absolute 4.61 1.70 - 7.00 10*3/mm3    Lymphocytes, Absolute 3.40 (H) 0.70 - 3.10 10*3/mm3    Monocytes, Absolute 0.87 0.10 - 0.90 10*3/mm3    Eosinophils, Absolute 0.07 0.00 - 0.40 10*3/mm3    Basophils, Absolute 0.03 0.00 - 0.20 10*3/mm3    Immature Grans, Absolute 0.02 0.00 - 0.05 10*3/mm3    nRBC 0.0 0.0 - 0.2 /100 WBC   D-dimer, Quantitative    Collection Time: 06/05/20  4:07 PM   Result Value Ref Range    D-Dimer, Quantitative 1.81 (H) 0.00 - 0.49 MCGFEU/mL   Comprehensive Metabolic Panel    Collection Time: 06/05/20  4:08 PM   Result Value Ref Range    Glucose 118 (H) 65 - 99 mg/dL    BUN 16 6 - 20 mg/dL    Creatinine 1.20 0.76 - 1.27 mg/dL    Sodium 140 136 - 145 mmol/L    Potassium 3.8 3.5 - 5.2 mmol/L    Chloride 107 98 - 107 mmol/L    CO2 26.1 22.0 - 29.0 mmol/L    Calcium 9.4 8.6 - 10.5 mg/dL    Total Protein 7.6 6.0 - 8.5 g/dL    Albumin 4.50 3.50 - 5.20 g/dL    ALT (SGPT) 55 (H) 1 - 41 U/L    AST (SGOT) 29 1 - 40 U/L    Alkaline Phosphatase 77 39 - 117 U/L    Total Bilirubin 0.6 0.2 - 1.2 mg/dL    eGFR  African Amer 77 >60 mL/min/1.73    Globulin 3.1 gm/dL    A/G Ratio 1.5 g/dL    BUN/Creatinine Ratio 13.3 7.0 - 25.0    Anion Gap 6.9 5.0 - 15.0 mmol/L   BNP    Collection Time: 06/05/20  4:08 PM   Result Value Ref Range    proBNP 3,754.0 (H) 5.0 - 900.0 pg/mL   Troponin    Collection Time: 06/05/20  4:08 PM   Result Value Ref Range    Troponin T <0.010  0.000 - 0.030 ng/mL   Urinalysis With Microscopic If Indicated (No Culture) - Urine, Clean Catch    Collection Time: 06/05/20  6:00 PM   Result Value Ref Range    Color, UA Yellow Yellow, Straw    Appearance, UA Clear Clear    pH, UA <=5.0 5.0 - 8.0    Specific Gravity, UA 1.023 1.005 - 1.030    Glucose, UA Negative Negative    Ketones, UA Negative Negative    Bilirubin, UA Negative Negative    Blood, UA Small (1+) (A) Negative    Protein, UA 30 mg/dL (1+) (A) Negative    Leuk Esterase, UA Negative Negative    Nitrite, UA Negative Negative    Urobilinogen, UA 1.0 E.U./dL 0.2 - 1.0 E.U./dL   Urinalysis, Microscopic Only - Urine, Clean Catch    Collection Time: 06/05/20  6:00 PM   Result Value Ref Range    RBC, UA 0-2 None Seen, 0-2 /HPF    WBC, UA 0-2 None Seen, 0-2 /HPF    Bacteria, UA None Seen None Seen /HPF    Squamous Epithelial Cells, UA 0-2 None Seen, 0-2 /HPF    Hyaline Casts, UA 0-2 None Seen /LPF    Methodology Automated Microscopy          RADIOLOGY RESULTS  CT Angiogram Chest   Final Result      XR Chest 1 View   Final Result   No focal pulmonary consolidation. Borderline heart size with   slight prominence of vascular and interstitial markings.       This report was finalized on 6/5/2020 4:55 PM by Dr. Phil Trivedi M.D.                PROGRESS, DATA ANALYSIS, CONSULTS AND MEDICAL DECISION MAKING  All labs have been independently reviewed by me.  All radiology studies have been reviewed by me and discussed with radiologist dictating the report.  EKG's independently viewed and interpreted by me unless stated otherwise. Discussion below represents my analysis of pertinent findings related to patient's condition, differential diagnosis, treatment plan and final disposition.     ED Course as of Jun 05 2040 Fri Jun 05, 2020   1630 EKG          EKG time: 1553  Rhythm/Rate:98, sinus rhythm  P waves and WV: normal WV, normal CHARMAINE  QRS, axis: LAFB, LVH, prolonged QTI  ST and T waves: no acute ST/T wave  abnormalities    Interpreted Contemporaneously by me, independently viewed  No prior available for comparison      [EW]   1721 I viewed chest x-ray on PACS system.  There are no acute focal infiltrates.  There is some degree of pulmonary vascular congestion    [EW]   1722 proBNP(!): 3,754.0 [EW]   1722 CTA ordered     D-Dimer, Quant(!): 1.81 [EW]   1726 Discussed with Dr. Keller.     [EW]   1931 Discussed with Dr. Castro, radiologist.  There is no pulmonary embolism.  The patient has bilateral small pleural effusions and he also has some enlarged mediastinal lymphadenopathy several measuring 2 cm which could be reactive but this does need that the patient needs follow-up in 3 to 6 months with repeat imaging    [EW]   1946 I discussed with patient at length the incidental finding of mediastinal lymphadenopathy.  I have discussed that he will need repeat CT chest imaging in 3 to 6 months.    [EW]   2003 Discussed pt's CT scan with Dr. Ramon, [facility] and my concern for new onset CHF.  He agrees to admit. I also discussed with Dr. Ramon the mediastinal lymphadenopathy which needs 3-6 month CT chest follow up imaging.     [EW]      ED Course User Index  [EW] Kathi Russell, WON        Differential diagnosis includes but is not limited to CHF, acute coronary syndrome, pulmonary embolism, pneumothorax, pneumonia, asthma/COPD, deconditioning, anemia, anxiety.     MDM:  New onset CHF evident with small bilateral pleural effusions on CTA combined with elevated BNP are worrisome for CHF.  He has normal renal function.  CTA negative for acute PE.  Troponin is normal.  His BP is poorly controlled and admission is necessary to help work on these issues. I also discussed with patient and Dr. Ramon the mediastinal lymphadenopathy which requires additional workup at later date.     1730: Reviewed pt's history and workup with Dr. Keller.  After a bedside evaluation, Dr. Keller agrees with the plan of care.    Based on the  "patient's lab findings and presenting symptoms, the doctor and I feel it is appropriate to admit the patient for further management, evaluation, and treatment.  I have discussed this with the admitting team.  I have also discussed this with the patient/family.  They are in agreement with admission.          Disposition vitals:  BP (!) 172/119   Pulse 89   Temp 98.8 °F (37.1 °C) (Tympanic)   Resp 18   Ht 188 cm (74\")   Wt 117 kg (258 lb 8 oz)   SpO2 93%   BMI 33.19 kg/m²       DIAGNOSIS  Final diagnoses:   Dyspnea, unspecified type   Bilateral pleural effusion   Mediastinal lymphadenopathy   Elevated brain natriuretic peptide (BNP) level       Admission     Kathi Russell, WON  06/05/20 2043    "

## 2020-06-05 NOTE — ED PROVIDER NOTES
I supervised care provided by the midlevel provider.   We have discussed this patient's history, physical exam, and treatment plan.  I have reviewed the note and personally saw and examined the patient and agree with the plan of care.   I have seen and evaluated this patient.  I am concerned that this gentleman has new onset congestive heart failure.  Very potentially could be right heart failure.  Pulmonary embolism still needs to be ruled out.  He is a .  He has significantly elevated blood pressure here of 180/128 with symptoms of exertional dyspnea and increased swelling to his lower extremities bilaterally.  He states he has been compliant with his blood pressure medicine.  He reports no chest pain.  He reports no fever or cough.  He currently is a smoker has vape in the past.  He is currently trying to stop smoking.  Last time he vape he says was a couple months ago    GENERAL: Patient is in some mild respiratory distress.  When he just moves around in the bed he becomes mildly tachypneic.  And symptomatic.  This consistent with his shortness of breath occurs at exertion and also he has orthopnea  HENT: nares patent  Head/neck/ face are symmetric without gross deformity or swelling  EYES: no scleral icterus  CV: regular rhythm, regular rate with intact distal pulses.  Patient's heart rate is in the 90s.  Has a soft systolic murmur 2 out of 6.  No rub rESPIRATORY: normal effort and mild tachypnea.  He has a very subtle wheeze on exam  ABDOMEN: soft and non-tender.  Obese  MUSCULOSKELETAL: no deformity.  Patient has 1+ edema to lower extremities and is symmetric  NEURO: alert and appropriate, moves all extremities, follows commands  SKIN: warm, dry    Vital signs and nursing notes reviewed.    Plan I reviewed lab work.  And EKG.  I spoke at length with the patient as well as his wife over the phone.  We will need to admit this patient and he will need some further evaluation.  First organ to rule out  a pulmonary embolism.  And he is more likely that this gentleman has congestive heart failure is his etiology of his symptoms.  We can start some blood pressure medicine here.  I discussed treatment plan and results with Kathi the nurse practitioner.  EKG reading EKG was done at 1553  Rate of 98  It is normal sinus rhythm  Intraventricular conduction delay  Bilateral atrial enlargement  Left axis deviation  Diffuse nonspecific ST and T wave changes.  Signs of LVH  QT is borderline prolonged  No old EKG to compare with.    I answered all the questions from the patient and spouse.  CTA of the chest is pending.  We are currently under a pandemic from the COVID19 infection.  The patient presented to the emergency department by ambulance or personal vehicle.  During current hospital restrictions no other visitors were present in the emergency department during my evaluation and treatment. I followed the current protocols required by Infection Control at [facility] in my evaluation and treatment of the patient. The patient was wearing a face mask during my evaluation and throughout my encounter. During my whole encounter with this patient I used appropriate personal protective equipment.  This equipment consisted of eye protection, facemask, gown, and gloves.  I applied this equipment before entering the room.    CT chest results reviewed.  Has findings of congestive heart failure most likely.  No pulmonary embolism.  We will also add a little bit of Lasix to his regime.       Arsen Keller MD  06/05/20 1952

## 2020-06-05 NOTE — ED TRIAGE NOTES
Pt reports SOA that becomes worse when he lies flat x3wks that has become worse. Pt reports edema to bilateral feet and ankles.    Pt was placed in a mask at triage.   This RN wore ppe.

## 2020-06-05 NOTE — TELEPHONE ENCOUNTER
Concern for lung disease/infection versus cardiac as he has HTN on lisinopril. Happy to see him next week if needed for ER FU

## 2020-06-05 NOTE — TELEPHONE ENCOUNTER
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  WIFE ALSO SAYS THAT EVERY TIME PT LAYS DOWN TO GO TO SLEEP HE HAS SHORTNESS OF BREATH AND CANT SLEEP BECAUSE OF IT.  WITH THE SHORTNESS OF BREATH SYMPTOM, I TOLD WIFE THAT I COULD NOT SCHEDULE PT AN OFFICE CATRINA BUT COULD SCHEDULE A MYCHART VISIT.  WIFE SAID SHE WAS GOING TO TAKE PT TO EMERGENCY ROOM ONCE THEY GOT BACK TO [place] FOR THIS ISSUE.

## 2020-06-06 ENCOUNTER — APPOINTMENT (OUTPATIENT)
Dept: CARDIOLOGY | Facility: HOSPITAL | Age: 50
End: 2020-06-06

## 2020-06-06 PROBLEM — I50.31 ACUTE DIASTOLIC CONGESTIVE HEART FAILURE (HCC): Status: ACTIVE | Noted: 2020-06-05

## 2020-06-06 LAB
ANION GAP SERPL CALCULATED.3IONS-SCNC: 10.9 MMOL/L (ref 5–15)
BH CV LOWER VASCULAR LEFT COMMON FEMORAL AUGMENT: NORMAL
BH CV LOWER VASCULAR LEFT COMMON FEMORAL COMPETENT: NORMAL
BH CV LOWER VASCULAR LEFT COMMON FEMORAL COMPRESS: NORMAL
BH CV LOWER VASCULAR LEFT COMMON FEMORAL PHASIC: NORMAL
BH CV LOWER VASCULAR LEFT COMMON FEMORAL SPONT: NORMAL
BH CV LOWER VASCULAR LEFT DISTAL FEMORAL COMPRESS: NORMAL
BH CV LOWER VASCULAR LEFT GASTRONEMIUS COMPRESS: NORMAL
BH CV LOWER VASCULAR LEFT GREATER SAPH AK COMPRESS: NORMAL
BH CV LOWER VASCULAR LEFT GREATER SAPH BK COMPRESS: NORMAL
BH CV LOWER VASCULAR LEFT MID FEMORAL AUGMENT: NORMAL
BH CV LOWER VASCULAR LEFT MID FEMORAL COMPETENT: NORMAL
BH CV LOWER VASCULAR LEFT MID FEMORAL COMPRESS: NORMAL
BH CV LOWER VASCULAR LEFT MID FEMORAL PHASIC: NORMAL
BH CV LOWER VASCULAR LEFT MID FEMORAL SPONT: NORMAL
BH CV LOWER VASCULAR LEFT PERONEAL COMPRESS: NORMAL
BH CV LOWER VASCULAR LEFT POPLITEAL AUGMENT: NORMAL
BH CV LOWER VASCULAR LEFT POPLITEAL COMPETENT: NORMAL
BH CV LOWER VASCULAR LEFT POPLITEAL COMPRESS: NORMAL
BH CV LOWER VASCULAR LEFT POPLITEAL PHASIC: NORMAL
BH CV LOWER VASCULAR LEFT POPLITEAL SPONT: NORMAL
BH CV LOWER VASCULAR LEFT POSTERIOR TIBIAL COMPRESS: NORMAL
BH CV LOWER VASCULAR LEFT PROFUNDA FEMORAL COMPRESS: NORMAL
BH CV LOWER VASCULAR LEFT PROXIMAL FEMORAL COMPRESS: NORMAL
BH CV LOWER VASCULAR LEFT SAPHENOFEMORAL JUNCTION COMPRESS: NORMAL
BH CV LOWER VASCULAR RIGHT COMMON FEMORAL AUGMENT: NORMAL
BH CV LOWER VASCULAR RIGHT COMMON FEMORAL COMPETENT: NORMAL
BH CV LOWER VASCULAR RIGHT COMMON FEMORAL COMPRESS: NORMAL
BH CV LOWER VASCULAR RIGHT COMMON FEMORAL PHASIC: NORMAL
BH CV LOWER VASCULAR RIGHT COMMON FEMORAL SPONT: NORMAL
BH CV LOWER VASCULAR RIGHT DISTAL FEMORAL COMPRESS: NORMAL
BH CV LOWER VASCULAR RIGHT GASTRONEMIUS COMPRESS: NORMAL
BH CV LOWER VASCULAR RIGHT GREATER SAPH AK COMPRESS: NORMAL
BH CV LOWER VASCULAR RIGHT GREATER SAPH BK COMPRESS: NORMAL
BH CV LOWER VASCULAR RIGHT MID FEMORAL AUGMENT: NORMAL
BH CV LOWER VASCULAR RIGHT MID FEMORAL COMPETENT: NORMAL
BH CV LOWER VASCULAR RIGHT MID FEMORAL COMPRESS: NORMAL
BH CV LOWER VASCULAR RIGHT MID FEMORAL PHASIC: NORMAL
BH CV LOWER VASCULAR RIGHT MID FEMORAL SPONT: NORMAL
BH CV LOWER VASCULAR RIGHT PERONEAL COMPRESS: NORMAL
BH CV LOWER VASCULAR RIGHT POPLITEAL AUGMENT: NORMAL
BH CV LOWER VASCULAR RIGHT POPLITEAL COMPETENT: NORMAL
BH CV LOWER VASCULAR RIGHT POPLITEAL COMPRESS: NORMAL
BH CV LOWER VASCULAR RIGHT POPLITEAL PHASIC: NORMAL
BH CV LOWER VASCULAR RIGHT POPLITEAL SPONT: NORMAL
BH CV LOWER VASCULAR RIGHT POSTERIOR TIBIAL COMPRESS: NORMAL
BH CV LOWER VASCULAR RIGHT PROFUNDA FEMORAL COMPRESS: NORMAL
BH CV LOWER VASCULAR RIGHT PROXIMAL FEMORAL COMPRESS: NORMAL
BH CV LOWER VASCULAR RIGHT SAPHENOFEMORAL JUNCTION COMPRESS: NORMAL
BUN BLD-MCNC: 15 MG/DL (ref 6–20)
BUN/CREAT SERPL: 13.6 (ref 7–25)
CALCIUM SPEC-SCNC: 8.9 MG/DL (ref 8.6–10.5)
CHLORIDE SERPL-SCNC: 108 MMOL/L (ref 98–107)
CO2 SERPL-SCNC: 24.1 MMOL/L (ref 22–29)
CREAT BLD-MCNC: 1.1 MG/DL (ref 0.76–1.27)
DEPRECATED RDW RBC AUTO: 47 FL (ref 37–54)
ERYTHROCYTE [DISTWIDTH] IN BLOOD BY AUTOMATED COUNT: 15.5 % (ref 12.3–15.4)
GFR SERPL CREATININE-BSD FRML MDRD: 86 ML/MIN/1.73
GLUCOSE BLD-MCNC: 107 MG/DL (ref 65–99)
HCT VFR BLD AUTO: 42.8 % (ref 37.5–51)
HGB BLD-MCNC: 13.8 G/DL (ref 13–17.7)
MCH RBC QN AUTO: 26.8 PG (ref 26.6–33)
MCHC RBC AUTO-ENTMCNC: 32.2 G/DL (ref 31.5–35.7)
MCV RBC AUTO: 83.1 FL (ref 79–97)
PLATELET # BLD AUTO: 335 10*3/MM3 (ref 140–450)
PMV BLD AUTO: 11.8 FL (ref 6–12)
POTASSIUM BLD-SCNC: 4.1 MMOL/L (ref 3.5–5.2)
RBC # BLD AUTO: 5.15 10*6/MM3 (ref 4.14–5.8)
SODIUM BLD-SCNC: 143 MMOL/L (ref 136–145)
WBC NRBC COR # BLD: 10.12 10*3/MM3 (ref 3.4–10.8)

## 2020-06-06 PROCEDURE — 80048 BASIC METABOLIC PNL TOTAL CA: CPT | Performed by: HOSPITALIST

## 2020-06-06 PROCEDURE — 25010000002 FUROSEMIDE PER 20 MG: Performed by: INTERNAL MEDICINE

## 2020-06-06 PROCEDURE — 85027 COMPLETE CBC AUTOMATED: CPT | Performed by: HOSPITALIST

## 2020-06-06 PROCEDURE — G0378 HOSPITAL OBSERVATION PER HR: HCPCS

## 2020-06-06 PROCEDURE — 25010000002 HYDRALAZINE PER 20 MG: Performed by: INTERNAL MEDICINE

## 2020-06-06 PROCEDURE — 25010000002 FUROSEMIDE PER 20 MG: Performed by: HOSPITALIST

## 2020-06-06 PROCEDURE — 93970 EXTREMITY STUDY: CPT

## 2020-06-06 PROCEDURE — 25010000002 ENOXAPARIN PER 10 MG: Performed by: HOSPITALIST

## 2020-06-06 PROCEDURE — 99204 OFFICE O/P NEW MOD 45 MIN: CPT | Performed by: INTERNAL MEDICINE

## 2020-06-06 RX ORDER — CLONIDINE HYDROCHLORIDE 0.1 MG/1
0.1 TABLET ORAL ONCE
Status: COMPLETED | OUTPATIENT
Start: 2020-06-06 | End: 2020-06-06

## 2020-06-06 RX ORDER — CARVEDILOL 6.25 MG/1
6.25 TABLET ORAL 2 TIMES DAILY WITH MEALS
Status: DISCONTINUED | OUTPATIENT
Start: 2020-06-06 | End: 2020-06-07

## 2020-06-06 RX ORDER — ACETAMINOPHEN 325 MG/1
650 TABLET ORAL EVERY 6 HOURS PRN
Status: DISCONTINUED | OUTPATIENT
Start: 2020-06-06 | End: 2020-06-09 | Stop reason: HOSPADM

## 2020-06-06 RX ORDER — HYDRALAZINE HYDROCHLORIDE 20 MG/ML
20 INJECTION INTRAMUSCULAR; INTRAVENOUS EVERY 6 HOURS PRN
Status: DISCONTINUED | OUTPATIENT
Start: 2020-06-06 | End: 2020-06-09 | Stop reason: HOSPADM

## 2020-06-06 RX ORDER — FUROSEMIDE 10 MG/ML
40 INJECTION INTRAMUSCULAR; INTRAVENOUS
Status: DISCONTINUED | OUTPATIENT
Start: 2020-06-06 | End: 2020-06-07

## 2020-06-06 RX ORDER — LISINOPRIL 10 MG/1
10 TABLET ORAL
Status: DISCONTINUED | OUTPATIENT
Start: 2020-06-06 | End: 2020-06-07

## 2020-06-06 RX ADMIN — CARVEDILOL 6.25 MG: 6.25 TABLET, FILM COATED ORAL at 19:44

## 2020-06-06 RX ADMIN — SODIUM CHLORIDE, PRESERVATIVE FREE 10 ML: 5 INJECTION INTRAVENOUS at 08:31

## 2020-06-06 RX ADMIN — HYDRALAZINE HYDROCHLORIDE 20 MG: 20 INJECTION INTRAMUSCULAR; INTRAVENOUS at 13:25

## 2020-06-06 RX ADMIN — METOPROLOL TARTRATE 25 MG: 25 TABLET ORAL at 08:29

## 2020-06-06 RX ADMIN — SODIUM CHLORIDE, PRESERVATIVE FREE 10 ML: 5 INJECTION INTRAVENOUS at 00:13

## 2020-06-06 RX ADMIN — FUROSEMIDE 40 MG: 10 INJECTION, SOLUTION INTRAMUSCULAR; INTRAVENOUS at 19:44

## 2020-06-06 RX ADMIN — FUROSEMIDE 40 MG: 10 INJECTION, SOLUTION INTRAMUSCULAR; INTRAVENOUS at 12:28

## 2020-06-06 RX ADMIN — ENOXAPARIN SODIUM 40 MG: 40 INJECTION SUBCUTANEOUS at 08:29

## 2020-06-06 RX ADMIN — METOPROLOL TARTRATE 25 MG: 25 TABLET ORAL at 00:11

## 2020-06-06 RX ADMIN — LISINOPRIL 10 MG: 10 TABLET ORAL at 22:27

## 2020-06-06 RX ADMIN — CLONIDINE HYDROCHLORIDE 0.1 MG: 0.1 TABLET ORAL at 08:29

## 2020-06-06 RX ADMIN — CLONIDINE HYDROCHLORIDE 0.1 MG: 0.1 TABLET ORAL at 03:17

## 2020-06-06 RX ADMIN — FUROSEMIDE 40 MG: 10 INJECTION, SOLUTION INTRAMUSCULAR; INTRAVENOUS at 00:11

## 2020-06-06 NOTE — PLAN OF CARE
No c/o pain, BP elevated hydralazine Q6 PRN x1, NAD, 2L SOA on exertion      Problem: Breathing Pattern Ineffective (Adult)  Goal: Identify Related Risk Factors and Signs and Symptoms  Outcome: Ongoing (interventions implemented as appropriate)  Goal: Effective Oxygenation/Ventilation  Outcome: Ongoing (interventions implemented as appropriate)  Goal: Anxiety/Fear Reduction  Outcome: Ongoing (interventions implemented as appropriate)     Problem: Cardiac: Heart Failure (Adult)  Goal: Signs and Symptoms of Listed Potential Problems Will be Absent, Minimized or Managed (Cardiac: Heart Failure)  Outcome: Ongoing (interventions implemented as appropriate)     Problem: Hypertensive Disease/Crisis (Arterial) (Adult)  Goal: Signs and Symptoms of Listed Potential Problems Will be Absent, Minimized or Managed (Hypertensive Disease/Crisis)  Outcome: Ongoing (interventions implemented as appropriate)

## 2020-06-06 NOTE — PROGRESS NOTES
Name: Marlo Merrill ADMIT: 2020   : 1970  PCP: Kaya Park APRN    MRN: [id number] LOS: 0 days   AGE/SEX: 50 y.o. male  ROOM: [facility]     Subjective   Subjective   Breathing much better.  Still short of breath when lays flat.  Leg still swollen but improved.  No chest pain or new complaints otherwise.    Review of Systems   Respiratory: Positive for shortness of breath.    Cardiovascular: Positive for leg swelling. Negative for chest pain.   Gastrointestinal: Negative for nausea.   Genitourinary: Positive for frequency.        Objective   Objective   Vital Signs  Temp:  [97.3 °F (36.3 °C)-98.8 °F (37.1 °C)] 98.2 °F (36.8 °C)  Heart Rate:  [] 80  Resp:  [16-23] 16  BP: (146-188)/(105-129) 166/110  SpO2:  [92 %-97 %] 92 %  on  Flow (L/min):  [2] 2;   Device (Oxygen Therapy): room air  Body mass index is 33.13 kg/m².    Intake/Output Summary (Last 24 hours) at 2020 1121  Last data filed at 2020 0917  Gross per 24 hour   Intake 240 ml   Output 2100 ml   Net -1860 ml     Wt Readings from Last 1 Encounters:   20 0635 117 kg (258 lb)   20 2128 120 kg (263 lb 14.4 oz)   20 1801 117 kg (258 lb 8 oz)     Wt Readings from Last 3 Encounters:   20 117 kg (258 lb)   19 120 kg (264 lb)   18 124 kg (273 lb)       Physical Exam   Constitutional: He is oriented to person, place, and time. He appears well-developed. He is cooperative. No distress.   Neck: No JVD present. No tracheal deviation present.   Cardiovascular: Normal rate and regular rhythm.   No murmur heard.  Pulmonary/Chest: Effort normal. No respiratory distress. He has rales.   Abdominal: Soft. Normal appearance and bowel sounds are normal. He exhibits no distension. There is no tenderness.   Musculoskeletal: He exhibits edema.   Neurological: He is alert and oriented to person, place, and time.   Skin: Skin is warm and dry.   Psychiatric: He has a normal mood and affect. His behavior is  normal.   Nursing note and vitals reviewed.      Results Review:       I reviewed the patient's new clinical results.  Results from last 7 days   Lab Units 06/06/20  0702 06/05/20  1607   WBC 10*3/mm3 10.12 9.00   HEMOGLOBIN g/dL 13.8 13.9   PLATELETS 10*3/mm3 335 343     Results from last 7 days   Lab Units 06/06/20  0702 06/05/20  1608   SODIUM mmol/L 143 140   POTASSIUM mmol/L 4.1 3.8   CHLORIDE mmol/L 108* 107   CO2 mmol/L 24.1 26.1   BUN mg/dL 15 16   CREATININE mg/dL 1.10 1.20   GLUCOSE mg/dL 107* 118*   Estimated Creatinine Clearance: 108 mL/min (by C-G formula based on SCr of 1.1 mg/dL).  Results from last 7 days   Lab Units 06/06/20  0702 06/05/20  1608   CALCIUM mg/dL 8.9 9.4   ALBUMIN g/dL  --  4.50     Results from last 7 days   Lab Units 06/05/20  1608 06/05/20  1607   INR   --  1.08   TROPONIN T ng/mL <0.010  --    PROBNP pg/mL 3,754.0*  --          Invalid input(s): LDLCALC       CT Angiogram Chest  1. The pulmonary arteries are moderately well opacified and there is no  evidence of pulmonary embolus. The thoracic aorta shows no evidence of  aneurysm. The timing of contrast does not allow evaluation for aortic  dissection.  2. There are small pleural effusions layering posteriorly. There is some  minimal faint groundglass opacity in the lungs that is nonspecific but  likely relates to an element of congestive heart failure associated with  the pleural effusions.  3. There are several enlarged mediastinal lymph nodes measuring up to  2.7 cm. There is an enlarged right hilar lymph node measuring 2.6 cm.  These remain nonspecific and may be reactive but followup CT scan in 3-6  months is recommended.  4. There is no pericardial effusion. The CT images through the upper  liver, spleen, and both adrenal glands are unremarkable.          carvedilol 6.25 mg Oral BID With Meals   enoxaparin 40 mg Subcutaneous Q24H   furosemide 40 mg Intravenous BID        Diet Regular; Cardiac       Assessment/Plan     Active  Hospital Problems    Diagnosis  POA   • **Acute diastolic congestive heart failure (CMS/HCC) [I50.31]  Yes   • Orthopnea [R06.01]  Yes   • Leg swelling [M79.89]  Yes   • Lymph nodes enlarged [R59.9]  Yes   • Hypertension [I10]  Yes      Resolved Hospital Problems   No resolved problems to display.       50 y.o. male admitted with Acute diastolic congestive heart failure (CMS/HCC).    · Continue telemetry. Cardiology following.  · Strict I/Os and daily weights. NC oxygen  · Echocardiogram pending  · Continue diuresis with LASIX 40 mg IV Q12 hours. Monitor renal function.  · BP uncontrolled and likely CHF due to hypertensive heart disease.  Currently on Coreg.  Will defer additional medications to cardiology.  · Lovenox 40 mg SC daily.  · Full code.  · Possible discharge in 1 to 2 days depending on echocardiogram results, response to treatment and cardiology recommendations.  · Lower extremity venous Doppler study pending.      Jaun Wolf MD  [facility]  06/06/20  11:21

## 2020-06-06 NOTE — PLAN OF CARE
Pt states he feels better after lasix doses. 2l o2 applied r/t soa earlier in the evening. VSS currently. Pt resting comfortably. No c/o voiced cont to monitor.  Problem: Breathing Pattern Ineffective (Adult)  Goal: Identify Related Risk Factors and Signs and Symptoms  Outcome: Ongoing (interventions implemented as appropriate)  Goal: Effective Oxygenation/Ventilation  Outcome: Ongoing (interventions implemented as appropriate)  Goal: Anxiety/Fear Reduction  Outcome: Ongoing (interventions implemented as appropriate)

## 2020-06-06 NOTE — PROGRESS NOTES
"Pharmacy Consult - Lovenox    Marlo Merrill has been consulted for pharmacy to dose enoxaparin for VTE prophylaxis per Dr Ramon's request.         Relevant clinical data and objective history reviewed:  50 y.o. male 188 cm (74\") 120 kg (263 lb 14.4 oz)    Home Anticoagulation: none    Past Medical History:   Diagnosis Date    Hypertension      has No Known Allergies.    Lab Results   Component Value Date    WBC 9.00 06/05/2020    HGB 13.9 06/05/2020    HCT 42.9 06/05/2020    MCV 82.2 06/05/2020     06/05/2020     Lab Results   Component Value Date    GLUCOSE 118 (H) 06/05/2020    CALCIUM 9.4 06/05/2020     06/05/2020    K 3.8 06/05/2020    CO2 26.1 06/05/2020     06/05/2020    BUN 16 06/05/2020    CREATININE 1.20 06/05/2020    EGFRIFAFRI 77 06/05/2020    BCR 13.3 06/05/2020    ANIONGAP 6.9 06/05/2020       Estimated Creatinine Clearance: 100.2 mL/min (by C-G formula based on SCr of 1.2 mg/dL).    Active Inpatient Anticoagulation Orders: none    Assessment/Plan    Will start patient on 40 mg subcutaneous every Q24h hours, adjusted for renal function. Labs to be ordered to be ordered by clinical pharmacist in morning. Pharmacy will continue to follow.     Dakota Feldman, [facility]    "

## 2020-06-06 NOTE — H&P
[facility]    Patient Identification:  Name: Marlo Merrill  Age: 50 y.o.  Sex: male  :  1970  MRN: [id number]         Primary Care Physician: Kaya Park APRN    Chief Complaint   Patient presents with   • Shortness of Breath     Subjective   Patient is a 50 y.o. male  admitted with shortness of breath, orthopnea.  It is been progressively getting worse over the last few weeks.  He is not able to lie flat anymore.  He also reports lower extremity swelling that is moderate to severe.  He is a  and due to the pandemic he has been driving up to 700 miles a day staying awake with 5-hour energy drinks and coffee.  No chest pain, fevers, cough.  He has received some Lasix with increased urine output and some improvement in his symptoms .  He states he has been eating a lot of salty food lately.  Sausage links, BLT.    Past Medical History:   Diagnosis Date   • Hypertension      History reviewed. No pertinent surgical history.  Current medications reviewed.    Family History   Problem Relation Age of Onset   • Hypertension Mother    • Heart disease Father         stent   • No Known Problems Sister    • No Known Problems Brother    • No Known Problems Maternal Grandmother    • No Known Problems Maternal Grandfather    • No Known Problems Paternal Grandmother    • No Known Problems Paternal Grandfather      Social History     Tobacco Use   • Smoking status: Former Smoker     Types: Cigarettes   • Smokeless tobacco: Never Used   Substance Use Topics   • Alcohol use: Yes   • Drug use: No     Review of Systems   Constitutional: Negative.  Negative for chills and fever.   HENT: Negative.    Eyes: Negative.    Respiratory: Positive for shortness of breath. Negative for chest tightness.    Cardiovascular: Positive for leg swelling. Negative for chest pain.   Gastrointestinal: Negative.  Negative for diarrhea, nausea and vomiting.    Endocrine: Negative.    Genitourinary: Negative.    Musculoskeletal: Negative.    Skin: Negative.    Allergic/Immunologic: Negative.    Neurological: Negative.    Hematological: Negative.    Psychiatric/Behavioral: Negative.      Objective      Vital Signs  Temp:  [97.3 °F (36.3 °C)-98.8 °F (37.1 °C)] 97.3 °F (36.3 °C)  Heart Rate:  [] 90  Resp:  [18-23] 20  BP: (157-188)/(105-129) 188/117  Body mass index is 33.88 kg/m².    Physical Exam   Constitutional: He is oriented to person, place, and time. He appears well-developed and well-nourished.  Non-toxic appearance. He does not have a sickly appearance. He does not appear ill. No distress.   HENT:   Head: Normocephalic and atraumatic.   Eyes: Conjunctivae and EOM are normal.   Neck: Neck supple. JVD present. No tracheal deviation present.   Cardiovascular: Normal rate, regular rhythm and intact distal pulses.   Pulses:       Radial pulses are 2+ on the right side, and 2+ on the left side.        Dorsalis pedis pulses are 2+ on the right side, and 2+ on the left side.   Pulmonary/Chest: Effort normal. No respiratory distress. He has decreased breath sounds in the right lower field and the left lower field. He has no wheezes. He has rales (very mild) in the right lower field and the left lower field.   Abdominal: Soft. Bowel sounds are normal. He exhibits no distension. There is no tenderness. There is no rebound and no guarding.   Musculoskeletal: He exhibits edema (2+ calves).   Lymphadenopathy:     He has no cervical adenopathy.   Neurological: He is alert and oriented to person, place, and time.   Skin: Skin is warm and dry.   Psychiatric: He has a normal mood and affect. His behavior is normal.     Results Review:  I reviewed the patient's new clinical results.  I reviewed the patient's new imaging results and agree with the interpretation.  I personally viewed and interpreted the patient's EKG/Telemetry data.  QTc 506    Lab Results   Component Value  Date    ANIONGAP 6.9 06/05/2020     Estimated Creatinine Clearance: 100.2 mL/min (by C-G formula based on SCr of 1.2 mg/dL).    Assessment/Plan   Active Hospital Problems    Diagnosis  POA   • **Congestive heart failure (CHF) (CMS/HCC) [I50.9]  Unknown   • Orthopnea [R06.01]  Unknown   • Leg swelling [M79.89]  Unknown   • Lymph nodes enlarged [R59.9]  Unknown   • Hypertension [I10]  Yes      Resolved Hospital Problems   No resolved problems to display.     50 y.o. male with edema, dyspnea on exertion, orthopnea for few weeks.  He also has been eating a lot of salty food lately    · Possible new CHF.  Continue diuresis and check echocardiogram.  Consult cardiology. I/O, daily weights.  Dietary salt intake contributing  · Hypertension: On lisinopril at home.  Monitor renal function. Metoprolol PO  · CT without PE but did show small pleural effusions, CHF with pleural effusions, enlarged lymph nodes- will need follow-up CT scan  · Dimer elevation nonspecific.  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   · discussed with patient, family and nursing staff and ED provider Kathi Russell.    Maikel Ramon MD  06/05/20  22:12

## 2020-06-06 NOTE — CONSULTS
Date of Hospital Visit: 2020  Encounter Provider: Zafar Julio Jr, MD  Place of Service: [facility] CARDIOLOGY  Patient Name: Marlo Merrill  :1970  Referral Provider: Maikel Ramon MD    Chief complaint: Shortness of breath and orthopnea    History of Present Illness: 50-year-old male  who has a past medical history of hypertension presents for evaluation of orthopnea and lower extremity edema.  Patient states he has had episodic periods of dyspnea on exertion for the past 3 months.  He quit smoking by starting with electronic cigarettes.  He noted an episode about 1 to 2 months ago where he was walking across the parking lot and felt dizzy like he was going to pass out because of how fast he was breathing.  Once he was able to rest he was able to move on.  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  He noticed worsening lower extremity edema and orthopnea with PND.  No palpitations or syncope. known hypertension for which she is only on lisinopril.  He does not check his blood pressure often.  He does not eat well or modify sodium intake due to his life on the road is planning to retire.  No previous cardiac work-up.  He drinks socially and denies illicit drug use.  No family history of premature coronary artery disease or sudden cardiac death.  His father had CABG in his 70s.      Past Medical History:   Diagnosis Date   • Hypertension        History reviewed. No pertinent surgical history.    Medications Prior to Admission   Medication Sig Dispense Refill Last Dose   • lisinopril (PRINIVIL,ZESTRIL) 20 MG tablet Take 1 tablet by mouth Daily. 90 tablet 1        Current Meds  Scheduled Meds:  enoxaparin 40 mg Subcutaneous Q24H   furosemide 40 mg Intravenous Q12H   metoprolol tartrate 25 mg Oral Q12H   sodium chloride 10 mL Intravenous Q12H     Continuous Infusions:   PRN Meds:.nitroglycerin  •   "[COMPLETED] Insert peripheral IV **AND** sodium chloride  •  sodium chloride    Allergies as of 06/05/2020   • (No Known Allergies)       Social History     Socioeconomic History   • Marital status:      Spouse name: Not on file   • Number of children: Not on file   • Years of education: Not on file   • Highest education level: Not on file   Tobacco Use   • Smoking status: Former Smoker     Types: Cigarettes   • Smokeless tobacco: Never Used   Substance and Sexual Activity   • Alcohol use: Yes   • Drug use: No       Family History   Problem Relation Age of Onset   • Hypertension Mother    • Heart disease Father         stent   • No Known Problems Sister    • No Known Problems Brother    • No Known Problems Maternal Grandmother    • No Known Problems Maternal Grandfather    • No Known Problems Paternal Grandmother    • No Known Problems Paternal Grandfather        Review of Systems   Constitution: Negative for chills and fever.   HENT: Negative for hoarse voice and sore throat.    Eyes: Negative for double vision and photophobia.   Cardiovascular: Positive for dyspnea on exertion, leg swelling, orthopnea and paroxysmal nocturnal dyspnea. Negative for chest pain, near-syncope, palpitations and syncope.   Respiratory: Positive for shortness of breath. Negative for cough and wheezing.    Skin: Negative for poor wound healing and rash.   Musculoskeletal: Negative for arthritis and joint swelling.   Gastrointestinal: Negative for bloating, abdominal pain, hematemesis and hematochezia.   Neurological: Negative for dizziness and focal weakness.   Psychiatric/Behavioral: Negative for depression and suicidal ideas.            Objective:   Temp:  [97.3 °F (36.3 °C)-98.8 °F (37.1 °C)] 98.2 °F (36.8 °C)  Heart Rate:  [] 80  Resp:  [16-23] 16  BP: (146-188)/(105-129) 166/110  Body mass index is 33.13 kg/m².  Flowsheet Rows      First Filed Value   Admission Height  188 cm (74\") Documented at 06/05/2020 1529 "   Admission Weight  117 kg (258 lb 8 oz) Documented at 06/05/2020 1801        Vitals:    06/06/20 0730   BP: (!) 166/110   Pulse: 80   Resp: 16   Temp: 98.2 °F (36.8 °C)   SpO2: 92%       Physical Exam   Constitutional: He is oriented to person, place, and time. He appears well-developed and well-nourished.   HENT:   Head: Normocephalic and atraumatic.   Eyes: Pupils are equal, round, and reactive to light. Conjunctivae are normal.   Neck: JVD present. No thyromegaly present.   Cardiovascular: Exam reveals no gallop and no friction rub.   No murmur heard.  Pulmonary/Chest: No respiratory distress. He exhibits no tenderness.   Bibasilar crackles   Abdominal: Bowel sounds are normal. He exhibits no distension.   Musculoskeletal: He exhibits edema. He exhibits no tenderness.   1+ edema bilateral   Neurological: He is alert and oriented to person, place, and time.   Skin: No rash noted. No erythema.   Psychiatric: He has a normal mood and affect. Judgment normal.   Vitals reviewed.              Lab Review:    Results from last 7 days   Lab Units 06/06/20  0702 06/05/20  1608   SODIUM mmol/L 143 140   POTASSIUM mmol/L 4.1 3.8   CHLORIDE mmol/L 108* 107   CO2 mmol/L 24.1 26.1   BUN mg/dL 15 16   CREATININE mg/dL 1.10 1.20   CALCIUM mg/dL 8.9 9.4   BILIRUBIN mg/dL  --  0.6   ALK PHOS U/L  --  77   ALT (SGPT) U/L  --  55*   AST (SGOT) U/L  --  29   GLUCOSE mg/dL 107* 118*     Results from last 7 days   Lab Units 06/05/20  1608   TROPONIN T ng/mL <0.010     @LABRCNTbnp@  Results from last 7 days   Lab Units 06/06/20  0702 06/05/20  1607   WBC 10*3/mm3 10.12 9.00   HEMOGLOBIN g/dL 13.8 13.9   HEMATOCRIT % 42.8 42.9   PLATELETS 10*3/mm3 335 343     Results from last 7 days   Lab Units 06/05/20  1607   INR  1.08         @LABNT(chol,trig,hdl,ldl)    I personally viewed and interpreted the patient's EKG/Telemetry data  )  Patient Active Problem List   Diagnosis   • Hypertension   • Orthopnea   • Congestive heart failure (CHF)  (CMS/HCC)   • Leg swelling   • Lymph nodes enlarged     Assessment and Plan:    1.  Congestive heart failure -etiology unknown.  EKG with LVH and secondary repolarization abnormalities.  Suspect hypertensive heart disease.  Echocardiogram pending.  No ischemic symptoms and would consider noninvasive outpatient ischemic work-up if patient has systolic heart failure.  We will start carvedilol today.  I will restart lisinopril tomorrow if renal function continues to improve.  Hydralazine as needed.  2.  Hypertension -volume and sodium restriction.  Monitor clinical progress for further treatment recommendations.  3.  History of tobacco abuse -patient is quit and I have advised him on the need for abstinence from nicotine products.    Zafar Julio Jr, MD  06/06/20  10:32.  Time spent in reviewing chart, discussion and examination:

## 2020-06-07 ENCOUNTER — APPOINTMENT (OUTPATIENT)
Dept: CARDIOLOGY | Facility: HOSPITAL | Age: 50
End: 2020-06-07

## 2020-06-07 PROBLEM — I50.21 HYPERTENSIVE HEART DISEASE WITH ACUTE SYSTOLIC CONGESTIVE HEART FAILURE (HCC): Status: ACTIVE | Noted: 2020-06-07

## 2020-06-07 PROBLEM — E66.9 OBESITY (BMI 30-39.9): Status: ACTIVE | Noted: 2020-06-07

## 2020-06-07 PROBLEM — I11.0 HYPERTENSIVE HEART DISEASE WITH ACUTE SYSTOLIC CONGESTIVE HEART FAILURE (HCC): Status: ACTIVE | Noted: 2020-06-07

## 2020-06-07 PROBLEM — I50.41 ACUTE COMBINED SYSTOLIC AND DIASTOLIC CONGESTIVE HEART FAILURE (HCC): Status: ACTIVE | Noted: 2020-06-05

## 2020-06-07 LAB
ANION GAP SERPL CALCULATED.3IONS-SCNC: 11.6 MMOL/L (ref 5–15)
BH CV ECHO MEAS - AO MAX PG (FULL): 2.3 MMHG
BH CV ECHO MEAS - AO MAX PG: 5.3 MMHG
BH CV ECHO MEAS - AO MEAN PG (FULL): 1.6 MMHG
BH CV ECHO MEAS - AO MEAN PG: 3.1 MMHG
BH CV ECHO MEAS - AO ROOT AREA (BSA CORRECTED): 1.5
BH CV ECHO MEAS - AO ROOT AREA: 10.3 CM^2
BH CV ECHO MEAS - AO ROOT DIAM: 3.6 CM
BH CV ECHO MEAS - AO V2 MAX: 115.4 CM/SEC
BH CV ECHO MEAS - AO V2 MEAN: 82.9 CM/SEC
BH CV ECHO MEAS - AO V2 VTI: 19.9 CM
BH CV ECHO MEAS - AVA(I,A): 3.1 CM^2
BH CV ECHO MEAS - AVA(I,D): 3.1 CM^2
BH CV ECHO MEAS - AVA(V,A): 2.9 CM^2
BH CV ECHO MEAS - AVA(V,D): 2.9 CM^2
BH CV ECHO MEAS - BSA(HAYCOCK): 2.5 M^2
BH CV ECHO MEAS - BSA: 2.4 M^2
BH CV ECHO MEAS - BZI_BMI: 32.8 KILOGRAMS/M^2
BH CV ECHO MEAS - BZI_METRIC_HEIGHT: 188 CM
BH CV ECHO MEAS - BZI_METRIC_WEIGHT: 116 KG
BH CV ECHO MEAS - DESC AORTA: 2.2 CM
BH CV ECHO MEAS - EDV(CUBED): 160.4 ML
BH CV ECHO MEAS - EDV(MOD-SP2): 83 ML
BH CV ECHO MEAS - EDV(MOD-SP4): 82 ML
BH CV ECHO MEAS - EDV(TEICH): 143.3 ML
BH CV ECHO MEAS - EF(CUBED): 54.8 %
BH CV ECHO MEAS - EF(MOD-BP): 45 %
BH CV ECHO MEAS - EF(MOD-SP2): 36.1 %
BH CV ECHO MEAS - EF(MOD-SP4): 41.5 %
BH CV ECHO MEAS - EF(TEICH): 46.1 %
BH CV ECHO MEAS - ESV(CUBED): 72.6 ML
BH CV ECHO MEAS - ESV(MOD-SP2): 53 ML
BH CV ECHO MEAS - ESV(MOD-SP4): 48 ML
BH CV ECHO MEAS - ESV(TEICH): 77.3 ML
BH CV ECHO MEAS - FS: 23.2 %
BH CV ECHO MEAS - IVS/LVPW: 0.9
BH CV ECHO MEAS - IVSD: 1.5 CM
BH CV ECHO MEAS - LAT PEAK E' VEL: 8.3 CM/SEC
BH CV ECHO MEAS - LV DIASTOLIC VOL/BSA (35-75): 34 ML/M^2
BH CV ECHO MEAS - LV MASS(C)D: 377.5 GRAMS
BH CV ECHO MEAS - LV MASS(C)DI: 156.5 GRAMS/M^2
BH CV ECHO MEAS - LV MAX PG: 3 MMHG
BH CV ECHO MEAS - LV MEAN PG: 1.5 MMHG
BH CV ECHO MEAS - LV SYSTOLIC VOL/BSA (12-30): 19.9 ML/M^2
BH CV ECHO MEAS - LV V1 MAX: 86.8 CM/SEC
BH CV ECHO MEAS - LV V1 MEAN: 57.1 CM/SEC
BH CV ECHO MEAS - LV V1 VTI: 15.7 CM
BH CV ECHO MEAS - LVIDD: 5.4 CM
BH CV ECHO MEAS - LVIDS: 4.2 CM
BH CV ECHO MEAS - LVLD AP2: 7.3 CM
BH CV ECHO MEAS - LVLD AP4: 6.8 CM
BH CV ECHO MEAS - LVLS AP2: 7.4 CM
BH CV ECHO MEAS - LVLS AP4: 5.7 CM
BH CV ECHO MEAS - LVOT AREA (M): 3.8 CM^2
BH CV ECHO MEAS - LVOT AREA: 3.9 CM^2
BH CV ECHO MEAS - LVOT DIAM: 2.2 CM
BH CV ECHO MEAS - LVPWD: 1.6 CM
BH CV ECHO MEAS - MED PEAK E' VEL: 6.7 CM/SEC
BH CV ECHO MEAS - MV A DUR: 183 SEC
BH CV ECHO MEAS - MV A MAX VEL: 46.1 CM/SEC
BH CV ECHO MEAS - MV DEC SLOPE: 744.1 CM/SEC^2
BH CV ECHO MEAS - MV DEC TIME: 99 SEC
BH CV ECHO MEAS - MV E MAX VEL: 92.2 CM/SEC
BH CV ECHO MEAS - MV E/A: 2
BH CV ECHO MEAS - MV MAX PG: 5.7 MMHG
BH CV ECHO MEAS - MV MEAN PG: 2.2 MMHG
BH CV ECHO MEAS - MV P1/2T MAX VEL: 118.1 CM/SEC
BH CV ECHO MEAS - MV P1/2T: 46.5 MSEC
BH CV ECHO MEAS - MV V2 MAX: 119.4 CM/SEC
BH CV ECHO MEAS - MV V2 MEAN: 67.1 CM/SEC
BH CV ECHO MEAS - MV V2 VTI: 22 CM
BH CV ECHO MEAS - MVA P1/2T LCG: 1.9 CM^2
BH CV ECHO MEAS - MVA(P1/2T): 4.7 CM^2
BH CV ECHO MEAS - MVA(VTI): 2.8 CM^2
BH CV ECHO MEAS - PA MAX PG (FULL): 1.5 MMHG
BH CV ECHO MEAS - PA MAX PG: 3.1 MMHG
BH CV ECHO MEAS - PA V2 MAX: 88.2 CM/SEC
BH CV ECHO MEAS - PVA(V,A): 3.6 CM^2
BH CV ECHO MEAS - PVA(V,D): 3.6 CM^2
BH CV ECHO MEAS - QP/QS: 1.1
BH CV ECHO MEAS - RV BASE (<4.1) - OBSOLETE: 3.3 CM
BH CV ECHO MEAS - RV LENGTH (<8.5) - OBSOLETE: 6 CM
BH CV ECHO MEAS - RV MAX PG: 1.6 MMHG
BH CV ECHO MEAS - RV MEAN PG: 0.81 MMHG
BH CV ECHO MEAS - RV V1 MAX: 63.1 CM/SEC
BH CV ECHO MEAS - RV V1 MEAN: 41 CM/SEC
BH CV ECHO MEAS - RV V1 VTI: 12.7 CM
BH CV ECHO MEAS - RVOT AREA: 5.1 CM^2
BH CV ECHO MEAS - RVOT DIAM: 2.5 CM
BH CV ECHO MEAS - SI(AO): 84.9 ML/M^2
BH CV ECHO MEAS - SI(CUBED): 36.4 ML/M^2
BH CV ECHO MEAS - SI(LVOT): 25.1 ML/M^2
BH CV ECHO MEAS - SI(MOD-SP2): 12.4 ML/M^2
BH CV ECHO MEAS - SI(MOD-SP4): 14.1 ML/M^2
BH CV ECHO MEAS - SI(TEICH): 27.4 ML/M^2
BH CV ECHO MEAS - SV(AO): 204.9 ML
BH CV ECHO MEAS - SV(CUBED): 87.8 ML
BH CV ECHO MEAS - SV(LVOT): 60.6 ML
BH CV ECHO MEAS - SV(MOD-SP2): 30 ML
BH CV ECHO MEAS - SV(MOD-SP4): 34 ML
BH CV ECHO MEAS - SV(RVOT): 64.5 ML
BH CV ECHO MEAS - SV(TEICH): 66 ML
BH CV ECHO MEAS - TAPSE (>1.6): 2.3 CM2
BH CV ECHO MEASUREMENTS AVERAGE E/E' RATIO: 12.29
BH CV XLRA - RV BASE: 3.3 CM
BH CV XLRA - RV LENGTH: 6 CM
BH CV XLRA - RV MID: 2.7 CM
BH CV XLRA - TDI S': 10.9 CM/SEC
BUN BLD-MCNC: 17 MG/DL (ref 6–20)
BUN/CREAT SERPL: 14.7 (ref 7–25)
CALCIUM SPEC-SCNC: 8.5 MG/DL (ref 8.6–10.5)
CHLORIDE SERPL-SCNC: 104 MMOL/L (ref 98–107)
CHOLEST SERPL-MCNC: 148 MG/DL (ref 0–200)
CO2 SERPL-SCNC: 24.4 MMOL/L (ref 22–29)
CREAT BLD-MCNC: 1.16 MG/DL (ref 0.76–1.27)
GFR SERPL CREATININE-BSD FRML MDRD: 80 ML/MIN/1.73
GLUCOSE BLD-MCNC: 114 MG/DL (ref 65–99)
HDLC SERPL-MCNC: 24 MG/DL (ref 40–60)
LDLC SERPL CALC-MCNC: 104 MG/DL (ref 0–100)
LDLC/HDLC SERPL: 4.32 {RATIO}
LEFT ATRIUM VOLUME INDEX: 54.8 ML/M2
MAXIMAL PREDICTED HEART RATE: 169 BPM
POTASSIUM BLD-SCNC: 3.5 MMOL/L (ref 3.5–5.2)
SODIUM BLD-SCNC: 140 MMOL/L (ref 136–145)
STRESS TARGET HR: 144 BPM
TRIGL SERPL-MCNC: 102 MG/DL (ref 0–150)
VLDLC SERPL-MCNC: 20.4 MG/DL (ref 5–40)

## 2020-06-07 PROCEDURE — 25010000002 HYDRALAZINE PER 20 MG: Performed by: INTERNAL MEDICINE

## 2020-06-07 PROCEDURE — G0378 HOSPITAL OBSERVATION PER HR: HCPCS

## 2020-06-07 PROCEDURE — 93306 TTE W/DOPPLER COMPLETE: CPT

## 2020-06-07 PROCEDURE — 25010000002 FUROSEMIDE PER 20 MG: Performed by: INTERNAL MEDICINE

## 2020-06-07 PROCEDURE — 25010000002 ENOXAPARIN PER 10 MG: Performed by: HOSPITALIST

## 2020-06-07 PROCEDURE — 93306 TTE W/DOPPLER COMPLETE: CPT | Performed by: INTERNAL MEDICINE

## 2020-06-07 PROCEDURE — 80061 LIPID PANEL: CPT | Performed by: HOSPITALIST

## 2020-06-07 PROCEDURE — 99213 OFFICE O/P EST LOW 20 MIN: CPT | Performed by: INTERNAL MEDICINE

## 2020-06-07 PROCEDURE — 80048 BASIC METABOLIC PNL TOTAL CA: CPT | Performed by: HOSPITALIST

## 2020-06-07 RX ORDER — LISINOPRIL 20 MG/1
20 TABLET ORAL
Status: DISCONTINUED | OUTPATIENT
Start: 2020-06-08 | End: 2020-06-08

## 2020-06-07 RX ORDER — FUROSEMIDE 40 MG/1
40 TABLET ORAL
Status: DISCONTINUED | OUTPATIENT
Start: 2020-06-07 | End: 2020-06-09 | Stop reason: HOSPADM

## 2020-06-07 RX ORDER — CARVEDILOL 12.5 MG/1
12.5 TABLET ORAL 2 TIMES DAILY WITH MEALS
Status: DISCONTINUED | OUTPATIENT
Start: 2020-06-07 | End: 2020-06-08

## 2020-06-07 RX ORDER — POTASSIUM CHLORIDE 750 MG/1
40 CAPSULE, EXTENDED RELEASE ORAL ONCE
Status: COMPLETED | OUTPATIENT
Start: 2020-06-07 | End: 2020-06-07

## 2020-06-07 RX ADMIN — FUROSEMIDE 40 MG: 10 INJECTION, SOLUTION INTRAMUSCULAR; INTRAVENOUS at 08:30

## 2020-06-07 RX ADMIN — ENOXAPARIN SODIUM 40 MG: 40 INJECTION SUBCUTANEOUS at 08:29

## 2020-06-07 RX ADMIN — LISINOPRIL 10 MG: 10 TABLET ORAL at 08:29

## 2020-06-07 RX ADMIN — ACETAMINOPHEN 650 MG: 325 TABLET, FILM COATED ORAL at 13:57

## 2020-06-07 RX ADMIN — CARVEDILOL 12.5 MG: 12.5 TABLET, FILM COATED ORAL at 18:01

## 2020-06-07 RX ADMIN — ACETAMINOPHEN 650 MG: 325 TABLET, FILM COATED ORAL at 06:03

## 2020-06-07 RX ADMIN — FUROSEMIDE 40 MG: 40 TABLET ORAL at 18:01

## 2020-06-07 RX ADMIN — POTASSIUM CHLORIDE 40 MEQ: 10 CAPSULE, COATED, EXTENDED RELEASE ORAL at 13:57

## 2020-06-07 RX ADMIN — HYDRALAZINE HYDROCHLORIDE 20 MG: 20 INJECTION INTRAMUSCULAR; INTRAVENOUS at 08:30

## 2020-06-07 RX ADMIN — HYDRALAZINE HYDROCHLORIDE 20 MG: 20 INJECTION INTRAMUSCULAR; INTRAVENOUS at 02:23

## 2020-06-07 RX ADMIN — CARVEDILOL 6.25 MG: 6.25 TABLET, FILM COATED ORAL at 08:29

## 2020-06-07 NOTE — PROGRESS NOTES
"[facility]    Patient Name: Marlo Merrill  :1970  50 y.o.  LOS: 0  Encounter Provider: Zafar Julio Jr, MD      Patient Care Team:  Kaya Park APRN as PCP - General (Family Medicine)    Chief Complaint: Follow-up acute combined systolic and diastolic heart failure, hypertensive heart disease, history of tobacco abuse    Interval History: No acute issues overnight.       Objective   Vital Signs  Temp:  [97.3 °F (36.3 °C)-98.2 °F (36.8 °C)] 98.2 °F (36.8 °C)  Heart Rate:  [81-90] 88  Resp:  [16-18] 18  BP: (149-176)/() 149/81    Intake/Output Summary (Last 24 hours) at 2020 1339  Last data filed at 2020 1054  Gross per 24 hour   Intake 800 ml   Output 4100 ml   Net -3300 ml     Flowsheet Rows      First Filed Value   Admission Height  188 cm (74\") Documented at 2020 1529   Admission Weight  117 kg (258 lb 8 oz) Documented at 2020 1801            Physical Exam   Constitutional: He is oriented to person, place, and time. He appears well-developed and well-nourished.   HENT:   Head: Normocephalic and atraumatic.   Eyes: Pupils are equal, round, and reactive to light. Conjunctivae are normal.   Neck: JVD present. No thyromegaly present.   Cardiovascular: Exam reveals no gallop and no friction rub.   No murmur heard.  Pulmonary/Chest: No respiratory distress. He exhibits no tenderness.   Mild bibasilar crackles improved   Abdominal: Bowel sounds are normal. He exhibits no distension.   Musculoskeletal: He exhibits edema. He exhibits no tenderness.   Trace edema nearly resolved   Neurological: He is alert and oriented to person, place, and time.   Skin: No rash noted. No erythema.   Psychiatric: He has a normal mood and affect. Judgment normal.   Vitals reviewed.        Pertinent Test Results:  Results from last 7 days   Lab Units 20  0551 20  0702 20  1608   SODIUM mmol/L 140 143 140   POTASSIUM mmol/L 3.5 4.1 3.8   CHLORIDE mmol/L " 104 108* 107   CO2 mmol/L 24.4 24.1 26.1   BUN mg/dL 17 15 16   CREATININE mg/dL 1.16 1.10 1.20   GLUCOSE mg/dL 114* 107* 118*   CALCIUM mg/dL 8.5* 8.9 9.4   AST (SGOT) U/L  --   --  29   ALT (SGPT) U/L  --   --  55*     Results from last 7 days   Lab Units 06/05/20  1608   TROPONIN T ng/mL <0.010     Results from last 7 days   Lab Units 06/06/20  0702 06/05/20  1607   WBC 10*3/mm3 10.12 9.00   HEMOGLOBIN g/dL 13.8 13.9   HEMATOCRIT % 42.8 42.9   PLATELETS 10*3/mm3 335 343     Results from last 7 days   Lab Units 06/05/20  1607   INR  1.08         Results from last 7 days   Lab Units 06/07/20  0551   CHOLESTEROL mg/dL 148   TRIGLYCERIDES mg/dL 102   HDL CHOL mg/dL 24*     Results from last 7 days   Lab Units 06/05/20  1608   PROBNP pg/mL 3,754.0*               Medication Review:     carvedilol 12.5 mg Oral BID With Meals   enoxaparin 40 mg Subcutaneous Q24H   furosemide 40 mg Oral BID   [START ON 6/8/2020] lisinopril 20 mg Oral Q24H   potassium chloride 40 mEq Oral Once             Assessment/Plan   1.  Congestive heart failure -etiology likely hypertensive heart disease.  EKG with LVH and secondary repolarization abnormalities.    Echocardiogram with LVH and biventricular failure.  No ischemic symptoms at the current time and will plan noninvasive outpatient ischemic work-up as outpatient.  Increase carvedilol and lisinopril.  Hydralazine as needed.  2.  Hypertension -volume and sodium restriction.  Monitor clinical progress for further treatment recommendations.  3.  History of tobacco abuse -patient is quit and I have advised him on the need for abstinence from nicotine products.    Zafar Julio Jr, MD  [facility]  06/07/20  1:39 PM

## 2020-06-07 NOTE — PLAN OF CARE
Pt up ad ion in room, no c/o voiced. RN explain new medications. Pt remains hypertensive post scheduled meds. Hydralazine given. Cont to monitor.  Problem: Breathing Pattern Ineffective (Adult)  Goal: Identify Related Risk Factors and Signs and Symptoms  Outcome: Ongoing (interventions implemented as appropriate)  Flowsheets (Taken 6/7/2020 0302)  Related Risk Factors (Breathing Pattern Ineffective): underlying condition  Goal: Effective Oxygenation/Ventilation  Outcome: Ongoing (interventions implemented as appropriate)  Goal: Anxiety/Fear Reduction  Outcome: Ongoing (interventions implemented as appropriate)

## 2020-06-07 NOTE — PROGRESS NOTES
Name: Marlo Merrill ADMIT: 2020   : 1970  PCP: Kaya Park APRN    MRN: [id number] LOS: 0 days   AGE/SEX: 50 y.o. male  ROOM: [facility]     Subjective   Subjective   Continues to do well.  No new complaints.    Review of Systems   Respiratory: Positive for shortness of breath.    Cardiovascular: Positive for leg swelling. Negative for chest pain.   Gastrointestinal: Negative for nausea.   Genitourinary: Positive for frequency.        Objective   Objective   Vital Signs  Temp:  [97.3 °F (36.3 °C)-98 °F (36.7 °C)] 97.9 °F (36.6 °C)  Heart Rate:  [80-90] 86  Resp:  [16-18] 18  BP: (153-176)/() 172/101  SpO2:  [94 %-97 %] 94 %  on  Flow (L/min):  [1-2] 1;   Device (Oxygen Therapy): room air  Body mass index is 32.78 kg/m².    Intake/Output Summary (Last 24 hours) at 2020 1205  Last data filed at 2020 1054  Gross per 24 hour   Intake 800 ml   Output 4100 ml   Net -3300 ml     Wt Readings from Last 1 Encounters:   20 0604 116 kg (255 lb 4.8 oz)   20 0635 117 kg (258 lb)   20 2128 120 kg (263 lb 14.4 oz)   20 1801 117 kg (258 lb 8 oz)     Wt Readings from Last 3 Encounters:   20 116 kg (255 lb 4.8 oz)   19 120 kg (264 lb)   18 124 kg (273 lb)       Physical Exam   Constitutional: He is oriented to person, place, and time. He appears well-developed. He is cooperative. No distress.   Neck: No JVD present. No tracheal deviation present.   Cardiovascular: Normal rate and regular rhythm.   No murmur heard.  Pulmonary/Chest: Effort normal. No respiratory distress. He has rales.   Abdominal: Soft. Normal appearance and bowel sounds are normal. He exhibits no distension. There is no tenderness.   Musculoskeletal: He exhibits edema.   Neurological: He is alert and oriented to person, place, and time.   Skin: Skin is warm and dry.   Psychiatric: He has a normal mood and affect. His behavior is normal.   Nursing note and vitals reviewed.      Results  Review:       I reviewed the patient's new clinical results.  Results from last 7 days   Lab Units 06/06/20  0702 06/05/20  1607   WBC 10*3/mm3 10.12 9.00   HEMOGLOBIN g/dL 13.8 13.9   PLATELETS 10*3/mm3 335 343     Results from last 7 days   Lab Units 06/07/20  0551 06/06/20  0702 06/05/20  1608   SODIUM mmol/L 140 143 140   POTASSIUM mmol/L 3.5 4.1 3.8   CHLORIDE mmol/L 104 108* 107   CO2 mmol/L 24.4 24.1 26.1   BUN mg/dL 17 15 16   CREATININE mg/dL 1.16 1.10 1.20   GLUCOSE mg/dL 114* 107* 118*   Estimated Creatinine Clearance: 102 mL/min (by C-G formula based on SCr of 1.16 mg/dL).  Results from last 7 days   Lab Units 06/07/20  0551 06/06/20  0702 06/05/20  1608   CALCIUM mg/dL 8.5* 8.9 9.4   ALBUMIN g/dL  --   --  4.50     Results from last 7 days   Lab Units 06/05/20  1608 06/05/20  1607   INR   --  1.08   TROPONIN T ng/mL <0.010  --    PROBNP pg/mL 3,754.0*  --      Results from last 7 days   Lab Units 06/07/20  0551   CHOLESTEROL mg/dL 148   TRIGLYCERIDES mg/dL 102   HDL CHOL mg/dL 24*      Results for orders placed during the hospital encounter of 06/05/20   Duplex Venous Lower Extremity - Bilateral CAR    Narrative · Normal bilateral lower extremity venous duplex scan.            CT Angiogram Chest  1. The pulmonary arteries are moderately well opacified and there is no  evidence of pulmonary embolus. The thoracic aorta shows no evidence of  aneurysm. The timing of contrast does not allow evaluation for aortic  dissection.  2. There are small pleural effusions layering posteriorly. There is some  minimal faint groundglass opacity in the lungs that is nonspecific but  likely relates to an element of congestive heart failure associated with  the pleural effusions.  3. There are several enlarged mediastinal lymph nodes measuring up to  2.7 cm. There is an enlarged right hilar lymph node measuring 2.6 cm.  These remain nonspecific and may be reactive but followup CT scan in 3-6  months is recommended.  4.  There is no pericardial effusion. The CT images through the upper  liver, spleen, and both adrenal glands are unremarkable.     Results for orders placed during the hospital encounter of 06/05/20   Adult Transthoracic Echo Complete W/ Cont if Necessary Per Protocol    Narrative · Mild tricuspid valve regurgitation is present.  · Right ventricular cavity is mildly dilated.  · The left ventricular cavity is mildly dilated.  · Left atrial cavity size is mild-to-moderately dilated.  · The following left ventricular wall segments are hypokinetic: basal   anterolateral, mid anterolateral, basal inferolateral and mid   inferolateral.  · Mild mitral valve regurgitation is present  · Calculated EF = 45%  · There is no evidence of pericardial effusion.            carvedilol 12.5 mg Oral BID With Meals   enoxaparin 40 mg Subcutaneous Q24H   furosemide 40 mg Oral BID   [START ON 6/8/2020] lisinopril 20 mg Oral Q24H   potassium chloride 40 mEq Oral Once        Diet Regular; Cardiac       Assessment/Plan     Active Hospital Problems    Diagnosis  POA   • **Hypertensive heart disease with acute systolic congestive heart failure (CMS/HCC) [I11.0, I50.21]  Yes   • Obesity (BMI 30-39.9) [E66.9]  Yes   • Orthopnea [R06.01]  Yes   • Acute combined systolic and diastolic congestive heart failure (CMS/HCC) [I50.41]  Yes   • Leg swelling [M79.89]  Yes   • Lymph nodes enlarged [R59.9]  Yes   • Hypertension [I10]  Yes      Resolved Hospital Problems   No resolved problems to display.       50 y.o. male admitted with Hypertensive heart disease with acute systolic congestive heart failure (CMS/HCC).    · Continue telemetry. Cardiology following.  · Strict I/Os and daily weights. NC oxygen  · Echocardiogram reviewed above and with cardiology.  Most consistent with hypertensive heart disease.  · Plan to change to oral diuretic today.  Probable discharge tomorrow if maintains a good urine output.  Needs close monitoring of blood pressure after  discharge.  He will be [facility] up with [facility].      Jaun Wolf MD  [facility]  06/07/20  12:05

## 2020-06-07 NOTE — PLAN OF CARE
C/o HA Tylenol x1, BP better with meds/ hydralazine x1, NAD, strict I/O      Problem: Breathing Pattern Ineffective (Adult)  Goal: Identify Related Risk Factors and Signs and Symptoms  Outcome: Ongoing (interventions implemented as appropriate)  Goal: Effective Oxygenation/Ventilation  Outcome: Ongoing (interventions implemented as appropriate)  Goal: Anxiety/Fear Reduction  Outcome: Ongoing (interventions implemented as appropriate)     Problem: Cardiac: Heart Failure (Adult)  Goal: Signs and Symptoms of Listed Potential Problems Will be Absent, Minimized or Managed (Cardiac: Heart Failure)  Outcome: Ongoing (interventions implemented as appropriate)     Problem: Hypertensive Disease/Crisis (Arterial) (Adult)  Goal: Signs and Symptoms of Listed Potential Problems Will be Absent, Minimized or Managed (Hypertensive Disease/Crisis)  Outcome: Ongoing (interventions implemented as appropriate)

## 2020-06-08 PROCEDURE — 25010000002 HYDRALAZINE PER 20 MG: Performed by: INTERNAL MEDICINE

## 2020-06-08 PROCEDURE — 25010000002 ENOXAPARIN PER 10 MG: Performed by: HOSPITALIST

## 2020-06-08 PROCEDURE — 99213 OFFICE O/P EST LOW 20 MIN: CPT | Performed by: NURSE PRACTITIONER

## 2020-06-08 RX ORDER — AMLODIPINE BESYLATE 5 MG/1
5 TABLET ORAL
Status: DISCONTINUED | OUTPATIENT
Start: 2020-06-08 | End: 2020-06-09 | Stop reason: HOSPADM

## 2020-06-08 RX ORDER — CARVEDILOL 25 MG/1
25 TABLET ORAL 2 TIMES DAILY WITH MEALS
Status: DISCONTINUED | OUTPATIENT
Start: 2020-06-08 | End: 2020-06-09 | Stop reason: HOSPADM

## 2020-06-08 RX ORDER — ASPIRIN 81 MG/1
81 TABLET ORAL DAILY
Status: DISCONTINUED | OUTPATIENT
Start: 2020-06-08 | End: 2020-06-09 | Stop reason: HOSPADM

## 2020-06-08 RX ORDER — LISINOPRIL 40 MG/1
40 TABLET ORAL
Status: DISCONTINUED | OUTPATIENT
Start: 2020-06-09 | End: 2020-06-09 | Stop reason: HOSPADM

## 2020-06-08 RX ADMIN — CARVEDILOL 25 MG: 12.5 TABLET, FILM COATED ORAL at 17:09

## 2020-06-08 RX ADMIN — ASPIRIN 81 MG: 81 TABLET, COATED ORAL at 12:56

## 2020-06-08 RX ADMIN — HYDRALAZINE HYDROCHLORIDE 20 MG: 20 INJECTION INTRAMUSCULAR; INTRAVENOUS at 00:50

## 2020-06-08 RX ADMIN — CARVEDILOL 25 MG: 12.5 TABLET, FILM COATED ORAL at 08:49

## 2020-06-08 RX ADMIN — LISINOPRIL 20 MG: 20 TABLET ORAL at 08:43

## 2020-06-08 RX ADMIN — ENOXAPARIN SODIUM 40 MG: 40 INJECTION SUBCUTANEOUS at 08:44

## 2020-06-08 RX ADMIN — FUROSEMIDE 40 MG: 40 TABLET ORAL at 17:09

## 2020-06-08 RX ADMIN — FUROSEMIDE 40 MG: 40 TABLET ORAL at 08:43

## 2020-06-08 RX ADMIN — AMLODIPINE BESYLATE 5 MG: 5 TABLET ORAL at 18:30

## 2020-06-08 RX ADMIN — HYDRALAZINE HYDROCHLORIDE 20 MG: 20 INJECTION INTRAMUSCULAR; INTRAVENOUS at 23:36

## 2020-06-08 NOTE — PROGRESS NOTES
Discharge Planning Assessment  [facility]     Patient Name: Marlo Merrill  MRN: [id number]  Today's Date: 6/8/2020    Admit Date: 6/5/2020    Discharge Needs Assessment     Row Name 06/08/20 0904       Living Environment    Lives With  spouse;other relative(s)    Name(s) of Who Lives With Patient  Spouse  ( Philly [phone]) and Mother in law ( Alecia Tovar [phone])    Current Living Arrangements  home/apartment/condo    Primary Care Provided by  self    Provides Primary Care For  no one    Family Caregiver if Needed  spouse;other relative(s)    Family Caregiver Names  Spouse  ( Philly [phone]) and Mother in law ( Alecia Tovar [phone])    Quality of Family Relationships  involved;supportive    Able to Return to Prior Arrangements  yes    Living Arrangement Comments  Pt lives in a two story house with spouse  ( Philly [phone]) and mother in law ( Alecia Tovar [phone]).       Resource/Environmental Concerns    Resource/Environmental Concerns  none    Transportation Concerns  car, none       Transition Planning    Patient/Family Anticipates Transition to  home with family    Patient/Family Anticipated Services at Transition  none    Transportation Anticipated  car, drives self;family or friend will provide       Discharge Needs Assessment    Readmission Within the Last 30 Days  no previous admission in last 30 days    Concerns to be Addressed  denies needs/concerns at this time    Equipment Currently Used at Home  none    Anticipated Changes Related to Illness  none    Equipment Needed After Discharge  none        Discharge Plan     Row Name 06/08/20 0906       Plan    Plan  Plan home with family.   JASBIR Mcconnell RN    Patient/Family in Agreement with Plan  yes    Plan Comments  FACE SHEET VERIFIED.  Spoke to pt at bedside. Pt's PCP is WON Caraballo.  Pt lives in a two story house with spouse  ( Philly [phone]) and mother in law ( Alecia Tovar [phone]).  Pt is  independent with ADL's.  Pt denies the use of any DME's.   Pt gets prescriptions at [facility]  ([facility]).  Pt denies any issues affording medications.  Pt is not current with HH.   Pt has not been in SNF.  Pt denies any discharge needs.  Plan home with family.   JASBIR Mcconnell RN        Destination      Coordination has not been started for this encounter.      Durable Medical Equipment      Coordination has not been started for this encounter.      Dialysis/Infusion      Coordination has not been started for this encounter.      Home Medical Care      Coordination has not been started for this encounter.      Therapy      Coordination has not been started for this encounter.      Community Resources      Coordination has not been started for this encounter.          Demographic Summary     Row Name 06/08/20 0904       General Information    Admission Type  observation    Arrived From  emergency department    Referral Source  admission list    Reason for Consult  discharge planning    Preferred Language  English     Used During This Interaction  no        Functional Status     Row Name 06/08/20 0904       Functional Status    Usual Activity Tolerance  good    Current Activity Tolerance  good       Functional Status, IADL    Medications  independent    Meal Preparation  independent    Housekeeping  independent    Laundry  independent    Shopping  independent       Mental Status    General Appearance WDL  WDL       Mental Status Summary    Recent Changes in Mental Status/Cognitive Functioning  no changes        Psychosocial    No documentation.       Abuse/Neglect    No documentation.       Legal    No documentation.       Substance Abuse    No documentation.       Patient Forms    No documentation.           Seema Mcconnell, RN

## 2020-06-08 NOTE — PROGRESS NOTES
Continued Stay Note  [facility]     Patient Name: Marlo Merrill  MRN: [id number]  Today's Date: 6/8/2020    Admit Date: 6/5/2020    Discharge Plan     Row Name 06/08/20 0906       Plan    Plan  Plan home with family.   JASBIR Mcconnell RN    Patient/Family in Agreement with Plan  yes    Plan Comments   FACE SHEET VERIFIED.  Spoke to pt at bedside. Pt's PCP is WON Caraballo.  Pt lives in a two story house with spouse  ( Philly [phone]) and mother in law ( Alecia Tovar [phone]).  Pt is independent with ADL's.  Pt denies the use of any DME's.   Pt gets prescriptions at [facility]  ([facility]).  Pt denies any issues affording medications.  Pt is not current with HH.   Pt has not been in SNF.  Pt denies any discharge needs.  Plan home with family.   JASBIR Mcconnell RN        Discharge Codes    No documentation.             Seema Mcconnell RN

## 2020-06-08 NOTE — PLAN OF CARE
B/p elevated, but improved with PRN hydralazine, no c/o pain, up ad ion, rested well, will continue to monitor

## 2020-06-08 NOTE — PROGRESS NOTES
Name: Marlo Merrill ADMIT: 2020   : 1970  PCP: Kaya Park APRN    MRN: [id number] LOS: 0 days   AGE/SEX: 50 y.o. male  ROOM: [facility]     Subjective   Subjective   Continues to do well.  No new complaints.  Breathing has improved.    Review of Systems   Respiratory: Positive for shortness of breath.    Cardiovascular: Positive for leg swelling. Negative for chest pain.   Gastrointestinal: Negative for nausea.   Genitourinary: Positive for frequency.        Objective   Objective   Vital Signs  Temp:  [97.9 °F (36.6 °C)-98.6 °F (37 °C)] 98.4 °F (36.9 °C)  Heart Rate:  [80-88] 86  Resp:  [18] 18  BP: (145-174)/() 171/100  SpO2:  [94 %-96 %] 95 %  on  Flow (L/min):  [1] 1;   Device (Oxygen Therapy): room air  Body mass index is 32.77 kg/m².    Intake/Output Summary (Last 24 hours) at 2020 1017  Last data filed at 2020 1800  Gross per 24 hour   Intake 360 ml   Output 1150 ml   Net -790 ml     Wt Readings from Last 1 Encounters:   20 0500 116 kg (255 lb 3.2 oz)   20 0604 116 kg (255 lb 4.8 oz)   20 0635 117 kg (258 lb)   20 2128 120 kg (263 lb 14.4 oz)   20 1801 117 kg (258 lb 8 oz)     Wt Readings from Last 3 Encounters:   20 116 kg (255 lb 3.2 oz)   19 120 kg (264 lb)   18 124 kg (273 lb)       Physical Exam   Constitutional: He is oriented to person, place, and time. He appears well-developed. He is cooperative. No distress.   Neck: No JVD present. No tracheal deviation present.   Cardiovascular: Normal rate and regular rhythm.   No murmur heard.  Pulmonary/Chest: Effort normal. No respiratory distress. He has no rales.   Abdominal: Soft. Normal appearance and bowel sounds are normal. He exhibits no distension. There is no tenderness.   Musculoskeletal: He exhibits no edema.   Neurological: He is alert and oriented to person, place, and time.   Skin: Skin is warm and dry.   Psychiatric: He has a normal mood and affect. His  behavior is normal.   Nursing note and vitals reviewed.      Results Review:       I reviewed the patient's new clinical results.  Results from last 7 days   Lab Units 06/06/20  0702 06/05/20  1607   WBC 10*3/mm3 10.12 9.00   HEMOGLOBIN g/dL 13.8 13.9   PLATELETS 10*3/mm3 335 343     Results from last 7 days   Lab Units 06/07/20  0551 06/06/20  0702 06/05/20  1608   SODIUM mmol/L 140 143 140   POTASSIUM mmol/L 3.5 4.1 3.8   CHLORIDE mmol/L 104 108* 107   CO2 mmol/L 24.4 24.1 26.1   BUN mg/dL 17 15 16   CREATININE mg/dL 1.16 1.10 1.20   GLUCOSE mg/dL 114* 107* 118*   Estimated Creatinine Clearance: 102 mL/min (by C-G formula based on SCr of 1.16 mg/dL).  Results from last 7 days   Lab Units 06/07/20  0551 06/06/20  0702 06/05/20  1608   CALCIUM mg/dL 8.5* 8.9 9.4   ALBUMIN g/dL  --   --  4.50     Results from last 7 days   Lab Units 06/05/20  1608 06/05/20  1607   INR   --  1.08   TROPONIN T ng/mL <0.010  --    PROBNP pg/mL 3,754.0*  --      Results from last 7 days   Lab Units 06/07/20  0551   CHOLESTEROL mg/dL 148   TRIGLYCERIDES mg/dL 102   HDL CHOL mg/dL 24*        Results for orders placed during the hospital encounter of 06/05/20   Duplex Venous Lower Extremity - Bilateral CAR    Narrative · Normal bilateral lower extremity venous duplex scan.        CT Angiogram Chest  1. The pulmonary arteries are moderately well opacified and there is no  evidence of pulmonary embolus. The thoracic aorta shows no evidence of  aneurysm. The timing of contrast does not allow evaluation for aortic  dissection.  2. There are small pleural effusions layering posteriorly. There is some  minimal faint groundglass opacity in the lungs that is nonspecific but  likely relates to an element of congestive heart failure associated with  the pleural effusions.  3. There are several enlarged mediastinal lymph nodes measuring up to  2.7 cm. There is an enlarged right hilar lymph node measuring 2.6 cm.  These remain nonspecific and may be  reactive but followup CT scan in 3-6  months is recommended.  4. There is no pericardial effusion. The CT images through the upper  liver, spleen, and both adrenal glands are unremarkable.     Results for orders placed during the hospital encounter of 06/05/20   Adult Transthoracic Echo Complete W/ Cont if Necessary Per Protocol    Narrative · Mild tricuspid valve regurgitation is present.  · Right ventricular cavity is mildly dilated.  · The left ventricular cavity is mildly dilated.  · Left atrial cavity size is mild-to-moderately dilated.  · The following left ventricular wall segments are hypokinetic: basal   anterolateral, mid anterolateral, basal inferolateral and mid   inferolateral.  · Mild mitral valve regurgitation is present  · Calculated EF = 45%  · There is no evidence of pericardial effusion.            aspirin 81 mg Oral Daily   carvedilol 25 mg Oral BID With Meals   enoxaparin 40 mg Subcutaneous Q24H   furosemide 40 mg Oral BID   [START ON 6/9/2020] lisinopril 40 mg Oral Q24H        Diet Regular; Cardiac  NPO Diet       Assessment/Plan     Active Hospital Problems    Diagnosis  POA   • **Hypertensive heart disease with acute systolic congestive heart failure (CMS/HCC) [I11.0, I50.21]  Yes   • Obesity (BMI 30-39.9) [E66.9]  Yes   • Orthopnea [R06.01]  Yes   • Acute combined systolic and diastolic congestive heart failure (CMS/HCC) [I50.41]  Yes   • Leg swelling [M79.89]  Yes   • Lymph nodes enlarged [R59.9]  Yes   • Hypertension [I10]  Yes      Resolved Hospital Problems   No resolved problems to display.       50 y.o. male admitted with Hypertensive heart disease with acute systolic congestive heart failure (CMS/HCC).    · Discussed with cardiology.  Will adjust blood pressure medication today and plan for stress test tomorrow.  · Stable on oral diuretics.      Jaun Wolf MD  [facility]  06/08/20  10:17

## 2020-06-08 NOTE — NURSING NOTE
Patient tolerating treatments no adverse side effects noted, bp elevated new dose of medicines noted and given this am for control, prn hydralazine still able to be used, patient hopes to go home today education was provided given high bp that may not be an appropriate plan, stress test ordered for am, next shift aware and to monitor.

## 2020-06-08 NOTE — PROGRESS NOTES
Hospital Follow Up    LOS:  LOS: 0 days   Patient Name: Marlo Merrill  Age/Sex: 50 y.o. male  : 1970  MRN: [id number]    Day of Service: 20   Length of Stay: 0  Encounter Provider: WON Dotson  Place of Service: [facility]  Patient Care Team:  Kaya Park APRN as PCP - General (Family Medicine)    Subjective:     Chief Complaint: Acute Diastolic CHF/SOA    Interval History: breathing better today, walking around in the room.    Objective:     Objective:  Temp:  [97.9 °F (36.6 °C)-98.6 °F (37 °C)] 98.4 °F (36.9 °C)  Heart Rate:  [80-88] 86  Resp:  [18] 18  BP: (145-174)/() 171/100     Intake/Output Summary (Last 24 hours) at 2020 1002  Last data filed at 2020 1800  Gross per 24 hour   Intake 360 ml   Output 1150 ml   Net -790 ml     Body mass index is 32.77 kg/m².      20  0635 20  0604 20  0500   Weight: 117 kg (258 lb) 116 kg (255 lb 4.8 oz) 116 kg (255 lb 3.2 oz)     Weight change: -0.045 kg (-1.6 oz)    Physical Exam:   General Appearance:    Awake alert and oriented in no acute distress.   Color:  Skin:  Neuro:  HEENT:    Lungs:     Pink  Warm and dry  No focal, motor or sensory deficits  Neck supple, pupils equal, round and reactive. No JVD, No Bruit  Clear to auscultation,respirations regular, even and                  unlabored    Heart:    Regular rate and rhythm, S1 and S2, no murmur, no gallop, no rub. No edema, DP/PT pulses are 2+   Chest Wall:    No abnormalities observed   Abdomen:     Normal bowel sounds, no masses, no organomegaly, soft        non-tender, non-distended, no guarding, no ascites noted   Extremities:   Moves all extremities well, no edema, no cyanosis, no redness       Lab Review:   Results from last 7 days   Lab Units 20  0551 20  0702 20  1608   SODIUM mmol/L 140 143 140   POTASSIUM mmol/L 3.5 4.1 3.8   CHLORIDE mmol/L 104 108* 107   CO2 mmol/L 24.4 24.1  26.1   BUN mg/dL 17 15 16   CREATININE mg/dL 1.16 1.10 1.20   GLUCOSE mg/dL 114* 107* 118*   CALCIUM mg/dL 8.5* 8.9 9.4   AST (SGOT) U/L  --   --  29   ALT (SGPT) U/L  --   --  55*     Results from last 7 days   Lab Units 06/05/20  1608   TROPONIN T ng/mL <0.010     Results from last 7 days   Lab Units 06/06/20  0702 06/05/20  1607   WBC 10*3/mm3 10.12 9.00   HEMOGLOBIN g/dL 13.8 13.9   HEMATOCRIT % 42.8 42.9   PLATELETS 10*3/mm3 335 343     Results from last 7 days   Lab Units 06/05/20  1607   INR  1.08         Results from last 7 days   Lab Units 06/07/20  0551   CHOLESTEROL mg/dL 148   TRIGLYCERIDES mg/dL 102   HDL CHOL mg/dL 24*     Results from last 7 days   Lab Units 06/05/20  1608   PROBNP pg/mL 3,754.0*         I reviewed the patient's new clinical results.  I personally viewed and interpreted the patient's EKG  Current Medications:   Scheduled Meds:  carvedilol 25 mg Oral BID With Meals   enoxaparin 40 mg Subcutaneous Q24H   furosemide 40 mg Oral BID   lisinopril 20 mg Oral Q24H     Continuous Infusions:     Allergies:  No Known Allergies     · ECHOLMild tricuspid valve regurgitation is present.  · Right ventricular cavity is mildly dilated.  · The left ventricular cavity is mildly dilated.  · Left atrial cavity size is mild-to-moderately dilated.  · The following left ventricular wall segments are hypokinetic: basal anterolateral, mid anterolateral, basal inferolateral and mid inferolateral.  · Mild mitral valve regurgitation is present  · Calculated EF = 45%  There is no evidence of pericardial effusion.    Assessment:     1. Acute combined Systolic and diastolic CHF EF 48% on echo  2. hypokinetic: basal anterolateral, mid anterolateral, basal inferolateral and mid inferolateral wall on echo.  3. Uncontrolled HTN  4. H/O tobacco abuse. Stopped smoking a few weeks ago.       Plan:   Will optimize his antihypertensives. His blood pressure is high today I increased his coreg to 25mg BID. If remains high may  need to add amlodipine. Appear euvolemic on exam and is sleeping flat. He has not had any chest pain at home just SOA and orthopnea. Given wall motion abnormalities on echo needs some ischemic workup. I am going to order a stress test for tomorrow and hopefully BP will be better by then.         WON Dotson  06/08/20  10:02  Electronically signed by WON Dotson, 06/08/20, 10:02 AM.

## 2020-06-08 NOTE — PLAN OF CARE
Problem: Breathing Pattern Ineffective (Adult)  Goal: Identify Related Risk Factors and Signs and Symptoms  Outcome: Ongoing (interventions implemented as appropriate)  Goal: Effective Oxygenation/Ventilation  Outcome: Ongoing (interventions implemented as appropriate)  Goal: Anxiety/Fear Reduction  Outcome: Ongoing (interventions implemented as appropriate)     Problem: Cardiac: Heart Failure (Adult)  Goal: Signs and Symptoms of Listed Potential Problems Will be Absent, Minimized or Managed (Cardiac: Heart Failure)  Outcome: Ongoing (interventions implemented as appropriate)     Problem: Hypertensive Disease/Crisis (Arterial) (Adult)  Goal: Signs and Symptoms of Listed Potential Problems Will be Absent, Minimized or Managed (Hypertensive Disease/Crisis)  Outcome: Ongoing (interventions implemented as appropriate)     Problem: Patient Care Overview  Goal: Plan of Care Review  Outcome: Ongoing (interventions implemented as appropriate)  Flowsheets (Taken 6/8/2020 1516)  Progress: improving  Plan of Care Reviewed With: patient  Outcome Summary: Patient has been pleasant and cooperative during shift. Stress test scheduled for tomorrow. NPO at 0000. Coreg increased. Lisinopril increased. No complaints of pain, nausea or SOA. Patient ambulating in phipps ad ion. Will continue to monitor and assist patient as needed.  Goal: Individualization and Mutuality  Outcome: Ongoing (interventions implemented as appropriate)  Goal: Discharge Needs Assessment  Outcome: Ongoing (interventions implemented as appropriate)  Goal: Interprofessional Rounds/Family Conf  Outcome: Ongoing (interventions implemented as appropriate)

## 2020-06-09 ENCOUNTER — READMISSION MANAGEMENT (OUTPATIENT)
Dept: CALL CENTER | Facility: HOSPITAL | Age: 50
End: 2020-06-09

## 2020-06-09 ENCOUNTER — APPOINTMENT (OUTPATIENT)
Dept: NUCLEAR MEDICINE | Facility: HOSPITAL | Age: 50
End: 2020-06-09

## 2020-06-09 VITALS
DIASTOLIC BLOOD PRESSURE: 110 MMHG | TEMPERATURE: 97.3 F | BODY MASS INDEX: 28.95 KG/M2 | SYSTOLIC BLOOD PRESSURE: 158 MMHG | HEIGHT: 74 IN | RESPIRATION RATE: 18 BRPM | HEART RATE: 86 BPM | WEIGHT: 225.6 LBS | OXYGEN SATURATION: 96 %

## 2020-06-09 LAB
ANION GAP SERPL CALCULATED.3IONS-SCNC: 10 MMOL/L (ref 5–15)
BH CV STRESS BP STAGE 1: NORMAL
BH CV STRESS BP STAGE 2: NORMAL
BH CV STRESS DURATION MIN STAGE 1: 3
BH CV STRESS DURATION MIN STAGE 2: 2
BH CV STRESS DURATION SEC STAGE 1: 0
BH CV STRESS DURATION SEC STAGE 2: 19
BH CV STRESS GRADE STAGE 1: 10
BH CV STRESS GRADE STAGE 2: 12
BH CV STRESS HR STAGE 1: 110
BH CV STRESS HR STAGE 2: 113
BH CV STRESS METS STAGE 1: 5
BH CV STRESS METS STAGE 2: 7.5
BH CV STRESS PROTOCOL 1: NORMAL
BH CV STRESS PROTOCOL 2 COMMENTS STAGE 1: NORMAL
BH CV STRESS PROTOCOL 2 DOSE REGADENOSON STAGE 1: 0.4
BH CV STRESS PROTOCOL 2 DURATION MIN STAGE 1: 0
BH CV STRESS PROTOCOL 2 DURATION SEC STAGE 1: 10
BH CV STRESS PROTOCOL 2 STAGE 1: 1
BH CV STRESS PROTOCOL 2: NORMAL
BH CV STRESS RECOVERY BP: NORMAL MMHG
BH CV STRESS RECOVERY HR: 90 BPM
BH CV STRESS SPEED STAGE 1: 1.7
BH CV STRESS SPEED STAGE 2: 2.5
BH CV STRESS STAGE 1: 1
BH CV STRESS STAGE 2: 2
BUN BLD-MCNC: 17 MG/DL (ref 6–20)
BUN/CREAT SERPL: 16.8 (ref 7–25)
CALCIUM SPEC-SCNC: 8.7 MG/DL (ref 8.6–10.5)
CHLORIDE SERPL-SCNC: 105 MMOL/L (ref 98–107)
CO2 SERPL-SCNC: 24 MMOL/L (ref 22–29)
CREAT BLD-MCNC: 1.01 MG/DL (ref 0.76–1.27)
GFR SERPL CREATININE-BSD FRML MDRD: 94 ML/MIN/1.73
GLUCOSE BLD-MCNC: 98 MG/DL (ref 65–99)
LV EF NUC BP: 30 %
MAXIMAL PREDICTED HEART RATE: 169 BPM
PERCENT MAX PREDICTED HR: 68.05 %
POTASSIUM BLD-SCNC: 3.8 MMOL/L (ref 3.5–5.2)
SODIUM BLD-SCNC: 139 MMOL/L (ref 136–145)
STRESS BASELINE BP: NORMAL MMHG
STRESS BASELINE HR: 81 BPM
STRESS PERCENT HR: 80 %
STRESS POST EXERCISE DUR MIN: 2 MIN
STRESS POST EXERCISE DUR SEC: 19 SEC
STRESS POST PEAK BP: NORMAL MMHG
STRESS POST PEAK HR: 115 BPM
STRESS TARGET HR: 144 BPM

## 2020-06-09 PROCEDURE — 93016 CV STRESS TEST SUPVJ ONLY: CPT | Performed by: INTERNAL MEDICINE

## 2020-06-09 PROCEDURE — 25010000002 REGADENOSON 0.4 MG/5ML SOLUTION: Performed by: HOSPITALIST

## 2020-06-09 PROCEDURE — A9500 TC99M SESTAMIBI: HCPCS | Performed by: HOSPITALIST

## 2020-06-09 PROCEDURE — 0 TECHNETIUM SESTAMIBI: Performed by: HOSPITALIST

## 2020-06-09 PROCEDURE — 80048 BASIC METABOLIC PNL TOTAL CA: CPT | Performed by: NURSE PRACTITIONER

## 2020-06-09 PROCEDURE — 93018 CV STRESS TEST I&R ONLY: CPT | Performed by: INTERNAL MEDICINE

## 2020-06-09 PROCEDURE — 93017 CV STRESS TEST TRACING ONLY: CPT

## 2020-06-09 PROCEDURE — 78452 HT MUSCLE IMAGE SPECT MULT: CPT

## 2020-06-09 PROCEDURE — 25010000002 ENOXAPARIN PER 10 MG: Performed by: HOSPITALIST

## 2020-06-09 PROCEDURE — 99232 SBSQ HOSP IP/OBS MODERATE 35: CPT | Performed by: INTERNAL MEDICINE

## 2020-06-09 PROCEDURE — 78452 HT MUSCLE IMAGE SPECT MULT: CPT | Performed by: INTERNAL MEDICINE

## 2020-06-09 RX ORDER — EPLERENONE 50 MG/1
50 TABLET, FILM COATED ORAL DAILY
Qty: 30 TABLET | Refills: 0 | Status: SHIPPED | OUTPATIENT
Start: 2020-06-10 | End: 2020-07-09 | Stop reason: SDUPTHER

## 2020-06-09 RX ORDER — ASPIRIN 81 MG/1
81 TABLET ORAL DAILY
Start: 2020-06-10 | End: 2021-02-04 | Stop reason: SDUPTHER

## 2020-06-09 RX ORDER — CARVEDILOL 25 MG/1
25 TABLET ORAL 2 TIMES DAILY WITH MEALS
Qty: 60 TABLET | Refills: 0 | Status: SHIPPED | OUTPATIENT
Start: 2020-06-09 | End: 2020-07-09 | Stop reason: SDUPTHER

## 2020-06-09 RX ORDER — LISINOPRIL 40 MG/1
40 TABLET ORAL DAILY
Qty: 30 TABLET | Refills: 0 | Status: SHIPPED | OUTPATIENT
Start: 2020-06-10 | End: 2020-07-09 | Stop reason: SDUPTHER

## 2020-06-09 RX ORDER — FUROSEMIDE 40 MG/1
40 TABLET ORAL 2 TIMES DAILY
Qty: 60 TABLET | Refills: 0 | Status: SHIPPED | OUTPATIENT
Start: 2020-06-09 | End: 2020-07-09 | Stop reason: SDUPTHER

## 2020-06-09 RX ORDER — AMLODIPINE BESYLATE 5 MG/1
5 TABLET ORAL DAILY
Qty: 30 TABLET | Refills: 0 | Status: SHIPPED | OUTPATIENT
Start: 2020-06-10 | End: 2020-07-09 | Stop reason: SDUPTHER

## 2020-06-09 RX ORDER — EPLERENONE 25 MG/1
50 TABLET, FILM COATED ORAL
Status: DISCONTINUED | OUTPATIENT
Start: 2020-06-09 | End: 2020-06-09 | Stop reason: HOSPADM

## 2020-06-09 RX ADMIN — AMLODIPINE BESYLATE 5 MG: 5 TABLET ORAL at 10:22

## 2020-06-09 RX ADMIN — TECHNETIUM TC 99M SESTAMIBI 1 DOSE: 1 INJECTION INTRAVENOUS at 08:17

## 2020-06-09 RX ADMIN — CARVEDILOL 25 MG: 12.5 TABLET, FILM COATED ORAL at 11:36

## 2020-06-09 RX ADMIN — ENOXAPARIN SODIUM 40 MG: 40 INJECTION SUBCUTANEOUS at 10:26

## 2020-06-09 RX ADMIN — TECHNETIUM TC 99M SESTAMIBI 1 DOSE: 1 INJECTION INTRAVENOUS at 07:30

## 2020-06-09 RX ADMIN — EPLERENONE 50 MG: 25 TABLET, FILM COATED ORAL at 13:33

## 2020-06-09 RX ADMIN — LISINOPRIL 40 MG: 40 TABLET ORAL at 10:22

## 2020-06-09 RX ADMIN — ASPIRIN 81 MG: 81 TABLET, COATED ORAL at 10:21

## 2020-06-09 RX ADMIN — FUROSEMIDE 40 MG: 40 TABLET ORAL at 10:22

## 2020-06-09 RX ADMIN — REGADENOSON 0.4 MG: 0.08 INJECTION, SOLUTION INTRAVENOUS at 08:17

## 2020-06-09 NOTE — PLAN OF CARE
Patient is resting abed now and denies any adverse side effects from his HTN condition such as headache, nausea, or seeing floaters. PRN Hydralazine administered and effective last night. No other issues noted at this time.

## 2020-06-09 NOTE — DISCHARGE SUMMARY
Patient Name: Marlo Merrill  : 1970  MRN: [id number]    Date of Admission: 2020  Date of Discharge:  2020  Primary Care Physician: Kaya Park APRN      Chief Complaint:   Shortness of Breath      Discharge Diagnoses     Active Hospital Problems    Diagnosis  POA   • **Hypertensive heart disease with acute systolic congestive heart failure (CMS/HCC) [I11.0, I50.21]  Yes   • Obesity (BMI 30-39.9) [E66.9]  Yes   • Orthopnea [R06.01]  Yes   • Acute combined systolic and diastolic congestive heart failure (CMS/HCC) [I50.41]  Yes   • Leg swelling [M79.89]  Yes   • Lymph nodes enlarged [R59.9]  Yes   • Hypertension [I10]  Yes      Resolved Hospital Problems   No resolved problems to display.        Hospital Course     Mr. Merrill is a 50 y.o.  with a history of hypertension who presented to [facility] initially complaining of shortness of breath.  Please see the admitting H&P for further details.  He was found to have new onset congestive heart failure and was admitted to the hospital for further evaluation and treatment.  He was given IV diuresis with good response.  Echocardiogram did show reduced EF of 45%.  He was treated medically and did undergo a stress test this morning.  Results outlined below.  Cardiology feels etiology for heart failure is uncontrolled hypertension.  They will follow him as outpatient may consider cardiac catheterization in the future.  He is otherwise stable and feels much improved and has been cleared for discharge home.      Day of Discharge     Breathing more comfortably.  No new complaints.    Physical Exam:  Temp:  [97.3 °F (36.3 °C)-98.1 °F (36.7 °C)] 97.3 °F (36.3 °C)  Heart Rate:  [70-86] 86  Resp:  [18] 18  BP: (134-170)/() 158/110  Body mass index is 28.97 kg/m².  Physical Exam   Constitutional: He is oriented to person, place, and time. He appears well-developed. He is cooperative. No distress.   Neck: No JVD  present. No tracheal deviation present.   Cardiovascular: Normal rate and regular rhythm.   No murmur heard.  Pulmonary/Chest: Effort normal. No respiratory distress. He has no rales.   Abdominal: Soft. Normal appearance and bowel sounds are normal. He exhibits no distension. There is no tenderness.   Musculoskeletal: He exhibits no edema.   Neurological: He is alert and oriented to person, place, and time.   Skin: Skin is warm and dry.   Psychiatric: He has a normal mood and affect. His behavior is normal.   Nursing note and vitals reviewed.      Consultants     Consult Orders (all) (From admission, onward)     Start     Ordered    06/05/20 2225  Inpatient Cardiology Consult  Once     Specialty:  Cardiology  Provider:  Marty Silva III, MD    06/05/20 2229              Procedures     STRESS TEST  6/9/20  Interpretation Summary   · Patient exercised for 5 minutes and 19 seconds's which had to be stopped secondary to shortness of breath and patient's inability to reach target heart rate. Pharmacological study started after that.  · Myocardial perfusion imaging indicates a medium-sized infarct located in the inferior wall versus attenuation artifact with no significant ischemia noted.  · Left ventricular ejection fraction is moderately reduced (Calculated EF = 30%). Enlarged LV cavity size. Abnormal LV wall motion consistent with moderate hypokinesis of the inferior wall.     CT Angiogram Chest  1. The pulmonary arteries are moderately well opacified and there is no  evidence of pulmonary embolus. The thoracic aorta shows no evidence of  aneurysm. The timing of contrast does not allow evaluation for aortic  dissection.  2. There are small pleural effusions layering posteriorly. There is some  minimal faint groundglass opacity in the lungs that is nonspecific but  likely relates to an element of congestive heart failure associated with  the pleural effusions.  3. There are several enlarged mediastinal lymph nodes  measuring up to  2.7 cm. There is an enlarged right hilar lymph node measuring 2.6 cm.  These remain nonspecific and may be reactive but followup CT scan in 3-6  months is recommended.  4. There is no pericardial effusion. The CT images through the upper  liver, spleen, and both adrenal glands are unremarkable.    Results for orders placed during the hospital encounter of 06/05/20   Adult Transthoracic Echo Complete W/ Cont if Necessary Per Protocol    Narrative · Mild tricuspid valve regurgitation is present.  · Right ventricular cavity is mildly dilated.  · The left ventricular cavity is mildly dilated.  · Left atrial cavity size is mild-to-moderately dilated.  · The following left ventricular wall segments are hypokinetic: basal   anterolateral, mid anterolateral, basal inferolateral and mid   inferolateral.  · Mild mitral valve regurgitation is present  · Calculated EF = 45%  · There is no evidence of pericardial effusion.        Pertinent Labs     Results from last 7 days   Lab Units 06/06/20  0702 06/05/20  1607   WBC 10*3/mm3 10.12 9.00   HEMOGLOBIN g/dL 13.8 13.9   PLATELETS 10*3/mm3 335 343     Results from last 7 days   Lab Units 06/09/20 0603 06/07/20  0551 06/06/20  0702 06/05/20  1608   SODIUM mmol/L 139 140 143 140   POTASSIUM mmol/L 3.8 3.5 4.1 3.8   CHLORIDE mmol/L 105 104 108* 107   CO2 mmol/L 24.0 24.4 24.1 26.1   BUN mg/dL 17 17 15 16   CREATININE mg/dL 1.01 1.16 1.10 1.20   GLUCOSE mg/dL 98 114* 107* 118*   Estimated Creatinine Clearance: 110.3 mL/min (by C-G formula based on SCr of 1.01 mg/dL).  Results from last 7 days   Lab Units 06/05/20  1608   ALBUMIN g/dL 4.50   BILIRUBIN mg/dL 0.6   ALK PHOS U/L 77   AST (SGOT) U/L 29   ALT (SGPT) U/L 55*     Results from last 7 days   Lab Units 06/09/20 0603 06/07/20  0551 06/06/20  0702 06/05/20  1608   CALCIUM mg/dL 8.7 8.5* 8.9 9.4   ALBUMIN g/dL  --   --   --  4.50       Results from last 7 days   Lab Units 06/05/20  1608 06/05/20  1607   TROPONIN  T ng/mL <0.010  --    PROBNP pg/mL 3,754.0*  --    D DIMER QUANT MCGFEU/mL  --  1.81*       Results from last 7 days   Lab Units 06/07/20  0551   CHOLESTEROL mg/dL 148   TRIGLYCERIDES mg/dL 102   HDL CHOL mg/dL 24*   LDL CHOL mg/dL 104*           Test Results Pending at Discharge       Discharge Details        Discharge Medications      New Medications      Instructions Start Date   amLODIPine 5 MG tablet  Commonly known as:  NORVASC   5 mg, Oral, Daily   Start Date:  Kathie 10, 2020     aspirin 81 MG EC tablet   81 mg, Oral, Daily   Start Date:  Kathie 10, 2020     carvedilol 25 MG tablet  Commonly known as:  COREG   25 mg, Oral, 2 Times Daily With Meals      eplerenone 50 MG tablet  Commonly known as:  INSPRA   50 mg, Oral, Daily   Start Date:  Kathie 10, 2020     furosemide 40 MG tablet  Commonly known as:  LASIX   40 mg, Oral, 2 Times Daily         Changes to Medications      Instructions Start Date   lisinopril 40 MG tablet  Commonly known as:  PRINIVIL,ZESTRIL  What changed:    · medication strength  · how much to take   40 mg, Oral, Daily   Start Date:  Kathie 10, 2020            No Known Allergies      Discharge Disposition:  Home or Self Care    Discharge Diet:  Diet Order   Procedures   • Diet Regular; Cardiac       Discharge Activity:   Activity Instructions     Activity as Tolerated            CODE STATUS:    Code Status and Medical Interventions:   Ordered at: 06/05/20 2229     Code Status:    CPR     Medical Interventions (Level of Support Prior to Arrest):    Full       No future appointments.  Follow-up Information     Zafar Julio Jr., MD. Schedule an appointment as soon as possible for a visit in 4 week(s).    Specialties:  Cardiology, Interventional Cardiology  Contact information:  [facility] [place] [address] [phone]             Pema Barlow, WON. Schedule an appointment as soon as possible for a visit in 4 week(s).    Specialties:  Cardiology, Nurse Practitioner  Contact  information:  [address]  [facility] [address] [phone]             Kaya Park APRN Follow up in 2 week(s).    Specialty:  Family Medicine  Contact information:  [address] [facility] [place] [address] [phone]                   Time Spent on Discharge:  Greater than 30 minutes      Jaun Wolf MD  [facility]  06/09/20  2:08 PM

## 2020-06-09 NOTE — PROGRESS NOTES
Case Management Discharge Note      Final Note: Pt discharged home.  ALFONSO Thomas         Destination      No service has been selected for the patient.      Durable Medical Equipment      No service has been selected for the patient.      Dialysis/Infusion      No service has been selected for the patient.      Home Medical Care      No service has been selected for the patient.      Therapy      No service has been selected for the patient.      Community Resources      No service has been selected for the patient.        Transportation Services  Private: Car    Final Discharge Disposition Code: 01 - home or self-care

## 2020-06-09 NOTE — PLAN OF CARE
Stress test completed. Denies pain or SOA. Room air. Blood pressures noted. Up ad ion. Telemetry SR.  Problem: Breathing Pattern Ineffective (Adult)  Goal: Identify Related Risk Factors and Signs and Symptoms  Outcome: Outcome(s) achieved  Goal: Effective Oxygenation/Ventilation  Outcome: Outcome(s) achieved  Goal: Anxiety/Fear Reduction  Outcome: Outcome(s) achieved     Problem: Breathing Pattern Ineffective (Adult)  Goal: Identify Related Risk Factors and Signs and Symptoms  Outcome: Outcome(s) achieved  Goal: Effective Oxygenation/Ventilation  Outcome: Outcome(s) achieved  Goal: Anxiety/Fear Reduction  Outcome: Outcome(s) achieved     Problem: Cardiac: Heart Failure (Adult)  Goal: Signs and Symptoms of Listed Potential Problems Will be Absent, Minimized or Managed (Cardiac: Heart Failure)  Outcome: Outcome(s) achieved     Problem: Hypertensive Disease/Crisis (Arterial) (Adult)  Goal: Signs and Symptoms of Listed Potential Problems Will be Absent, Minimized or Managed (Hypertensive Disease/Crisis)  Outcome: Outcome(s) achieved     Problem: Patient Care Overview  Goal: Plan of Care Review  Outcome: Outcome(s) achieved  Goal: Individualization and Mutuality  Outcome: Outcome(s) achieved  Goal: Discharge Needs Assessment  Outcome: Outcome(s) achieved  Goal: Interprofessional Rounds/Family Conf  Outcome: Outcome(s) achieved

## 2020-06-09 NOTE — PROGRESS NOTES
LOS: 1 day   Patient Care Team:  Kaya Park APRN as PCP - General (Family Medicine)    Chief Complaint: Follow-up for acute combined CHF, cardiomyopathy, severe hypertension.    Interval History: No chest pain or shortness of breath.  Cardiolite stress test from earlier today showed no significant ischemia.    Vital Signs:  Temp:  [97.7 °F (36.5 °C)-98.1 °F (36.7 °C)] 97.7 °F (36.5 °C)  Heart Rate:  [70-85] 85  Resp:  [18] 18  BP: (134-170)/() 170/105    Intake/Output Summary (Last 24 hours) at 6/9/2020 1301  Last data filed at 6/9/2020 0010  Gross per 24 hour   Intake --   Output 500 ml   Net -500 ml       Physical Exam:   General Appearance:    No acute distress, alert and oriented x4   Lungs:     Clear to auscultation bilaterally     Heart:    Regular rhythm and normal rate.  No murmurs, gallops, or       rubs.   Abdomen:     Soft, non-tender, non-distended.    Extremities:   Moves all extremities well.  No clubbing, cyanosis, or edema.     Results Review:    Results from last 7 days   Lab Units 06/09/20  0603   SODIUM mmol/L 139   POTASSIUM mmol/L 3.8   CHLORIDE mmol/L 105   CO2 mmol/L 24.0   BUN mg/dL 17   CREATININE mg/dL 1.01   GLUCOSE mg/dL 98   CALCIUM mg/dL 8.7     Results from last 7 days   Lab Units 06/05/20  1608   TROPONIN T ng/mL <0.010     Results from last 7 days   Lab Units 06/06/20  0702   WBC 10*3/mm3 10.12   HEMOGLOBIN g/dL 13.8   HEMATOCRIT % 42.8   PLATELETS 10*3/mm3 335     Results from last 7 days   Lab Units 06/05/20  1607   INR  1.08     Results from last 7 days   Lab Units 06/07/20  0551   CHOLESTEROL mg/dL 148         Results from last 7 days   Lab Units 06/07/20  0551   CHOLESTEROL mg/dL 148   TRIGLYCERIDES mg/dL 102   HDL CHOL mg/dL 24*   LDL CHOL mg/dL 104*       I reviewed the patient's new clinical results.        Assessment:  1.  Acute combined CHF  2.  Cardiomyopathy with ejection fraction 45% on echocardiogram  3.  Severe hypertension  4.  History of tobacco  use, recently quit several weeks ago    Plan:  -Stress test results reviewed.  There is no ischemia.  The ejection fraction was calculated at 30%, although the echocardiogram is more accurate for this, and I would say his ejection fraction is around 45%.    -Likely all hypertension mediated.  His blood pressure does get better at times, although spikes back up again.  I am going to add Inspra 50 mg/day to his regimen.  He will continue on the amlodipine, lisinopril, and Coreg at the current doses.    -No need for cardiac catheterization at this time.    -He is stable from a cardiac standpoint for discharge.  He will follow-up with WON Myers, in 1 week.  He will follow-up with Dr. Vargas in 4 weeks.    Dakota Carlin MD  06/09/20  13:01

## 2020-06-10 ENCOUNTER — TRANSITIONAL CARE MANAGEMENT TELEPHONE ENCOUNTER (OUTPATIENT)
Dept: CALL CENTER | Facility: HOSPITAL | Age: 50
End: 2020-06-10

## 2020-06-10 NOTE — OUTREACH NOTE
Prep Survey      Responses   [facility] [place] patient discharged from?  [place]   Is LACE score < 7 ?  No   Eligibility  [facility]   Date of Admission  06/05/20   Date of Discharge  06/09/20   Discharge Disposition  Home or Self Care   Discharge diagnosis  Hypertensive heart disease with acute systolic congestive heart failure   COVID-19 Test Status  Not tested   Does the patient have one of the following disease processes/diagnoses(primary or secondary)?  CHF   Does the patient have Home health ordered?  No   Is there a DME ordered?  No   Prep survey completed?  Yes          Marla Talavera RN

## 2020-06-10 NOTE — OUTREACH NOTE
Call Center TCM Note      Responses   [facility] patient discharged from?  [place]   Does the patient have one of the following disease processes/diagnoses(primary or secondary)?  CHF   TCM attempt successful?  No   Unsuccessful attempts  Attempt 1   Call Status  Voice mail issues [Mailbox Full]           Carole Hillman MA    6/10/2020, 12:28

## 2020-06-10 NOTE — OUTREACH NOTE
Call Center TCM Note      Responses   [facility] patient discharged from?  [place]   Does the patient have one of the following disease processes/diagnoses(primary or secondary)?  CHF   TCM attempt successful?  Yes   Call start time  1610   Call end time  1616   Discharge diagnosis  Hypertensive heart disease with acute systolic congestive heart failure   Meds reviewed with patient/caregiver?  Yes   Is the patient having any side effects they believe may be caused by any medication additions or changes?  No   Does the patient have all medications ordered at discharge?  Yes   Is the patient taking all medications as directed (includes completed medication regime)?  Yes   Does the patient have an appointment with their PCP within 7 days of discharge?  Greater than 7 days [TCM 06/23/2020. Pt works out of town]   Comments regarding PCP  Kaya Park   Has the patient kept scheduled appointments due by today?  Yes   Has home health visited the patient within 72 hours of discharge?  N/A   Did the patient receive a copy of their discharge instructions?  Yes   Nursing interventions  Reviewed instructions with patient   What is the patient's perception of their health status since discharge?  Improving   Is the patient weighing daily?  Yes   Does the patient have scales?  Yes   Is the patient able to teach back Heart Failure diet management?  Yes   Is the patient able to teach back signs and symptoms of worsening condition? (i.e. weight gain, shortness of air, etc.)  Yes   TCM call completed?  Yes   Wrap up additional comments  Pt doing well, feels better. Still some intermittent light headedness but feels this is more new mediations. No SOB, chest tpain. TCM is sched for 06/23/2020           Carole Hillman MA    6/10/2020, 16:18

## 2020-06-17 ENCOUNTER — READMISSION MANAGEMENT (OUTPATIENT)
Dept: CALL CENTER | Facility: HOSPITAL | Age: 50
End: 2020-06-17

## 2020-06-17 NOTE — OUTREACH NOTE
CHF Week 2 Survey      Responses   [facility] patient discharged from?  [place]   Does the patient have one of the following disease processes/diagnoses(primary or secondary)?  CHF   Week 2 attempt successful?  Yes   Call start time  1515   Call end time  1518   Meds reviewed with patient/caregiver?  Yes   Is the patient having any side effects they believe may be caused by any medication additions or changes?  No   Does the patient have all medications ordered at discharge?  Yes   Is the patient taking all medications as directed (includes completed medication regime)?  Yes   Does the patient have a primary care provider?   Yes   Does the patient have an appointment with their PCP within 7 days of discharge?  Greater than 7 days   Comments regarding PCP  PATIENT HAS AN APPOINTMENT WITH HIS PCP ON 6/23   What is preventing the patient from scheduling follow up appointments within 7 days of discharge?  Earlier appointment not available   Nursing Interventions  Verified appointment date/time/provider   Has the patient kept scheduled appointments due by today?  N/A   Has home health visited the patient within 72 hours of discharge?  N/A   Did the patient receive a copy of their discharge instructions?  Yes   Nursing interventions  Reviewed instructions with patient   What is the patient's perception of their health status since discharge?  Improving   Is the patient weighing daily?  Yes   Does the patient have scales?  Yes   Daily weight interventions  Education provided on importance of daily weight   Is the patient able to teach back Heart Failure diet management?  Yes   Is the patient able to teach back Heart Failure Zones?  Yes   Is the patient able to teach back signs and symptoms of worsening condition? (i.e. weight gain, shortness of air, etc.)  Yes   CHF Week 2 call completed?  Yes          Monica Ridley LPN

## 2020-06-23 ENCOUNTER — OFFICE VISIT (OUTPATIENT)
Dept: CARDIOLOGY | Facility: CLINIC | Age: 50
End: 2020-06-23

## 2020-06-23 ENCOUNTER — OFFICE VISIT (OUTPATIENT)
Dept: FAMILY MEDICINE CLINIC | Facility: CLINIC | Age: 50
End: 2020-06-23

## 2020-06-23 ENCOUNTER — RESULTS ENCOUNTER (OUTPATIENT)
Dept: FAMILY MEDICINE CLINIC | Facility: CLINIC | Age: 50
End: 2020-06-23

## 2020-06-23 ENCOUNTER — TELEPHONE (OUTPATIENT)
Dept: CARDIOLOGY | Facility: CLINIC | Age: 50
End: 2020-06-23

## 2020-06-23 VITALS
DIASTOLIC BLOOD PRESSURE: 72 MMHG | WEIGHT: 252.2 LBS | SYSTOLIC BLOOD PRESSURE: 122 MMHG | HEART RATE: 62 BPM | BODY MASS INDEX: 32.37 KG/M2 | OXYGEN SATURATION: 95 % | TEMPERATURE: 97 F | HEIGHT: 74 IN

## 2020-06-23 VITALS
DIASTOLIC BLOOD PRESSURE: 70 MMHG | HEIGHT: 74 IN | HEART RATE: 67 BPM | SYSTOLIC BLOOD PRESSURE: 120 MMHG | WEIGHT: 248 LBS | BODY MASS INDEX: 31.83 KG/M2

## 2020-06-23 DIAGNOSIS — I10 ESSENTIAL HYPERTENSION: Primary | ICD-10-CM

## 2020-06-23 DIAGNOSIS — R59.9 ENLARGED LYMPH NODE: ICD-10-CM

## 2020-06-23 DIAGNOSIS — Z11.59 ENCOUNTER FOR HEPATITIS C SCREENING TEST FOR LOW RISK PATIENT: ICD-10-CM

## 2020-06-23 DIAGNOSIS — I42.0 DILATED CARDIOMYOPATHY (HCC): ICD-10-CM

## 2020-06-23 DIAGNOSIS — E66.09 CLASS 1 OBESITY DUE TO EXCESS CALORIES WITH SERIOUS COMORBIDITY AND BODY MASS INDEX (BMI) OF 32.0 TO 32.9 IN ADULT: ICD-10-CM

## 2020-06-23 DIAGNOSIS — Z51.81 ENCOUNTER FOR THERAPEUTIC DRUG LEVEL MONITORING: ICD-10-CM

## 2020-06-23 DIAGNOSIS — R93.89 ABNORMAL CT OF THE CHEST: ICD-10-CM

## 2020-06-23 DIAGNOSIS — Z12.5 PROSTATE CANCER SCREENING: ICD-10-CM

## 2020-06-23 DIAGNOSIS — Z12.11 COLON CANCER SCREENING: ICD-10-CM

## 2020-06-23 DIAGNOSIS — E78.5 HYPERLIPIDEMIA, UNSPECIFIED HYPERLIPIDEMIA TYPE: ICD-10-CM

## 2020-06-23 DIAGNOSIS — I50.42 CHRONIC COMBINED SYSTOLIC AND DIASTOLIC CONGESTIVE HEART FAILURE (HCC): ICD-10-CM

## 2020-06-23 LAB
ANION GAP SERPL CALCULATED.3IONS-SCNC: 11.5 MMOL/L (ref 5–15)
BUN BLD-MCNC: 13 MG/DL (ref 6–20)
BUN/CREAT SERPL: 11.9 (ref 7–25)
CALCIUM SPEC-SCNC: 10.5 MG/DL (ref 8.6–10.5)
CHLORIDE SERPL-SCNC: 102 MMOL/L (ref 98–107)
CO2 SERPL-SCNC: 26.5 MMOL/L (ref 22–29)
CREAT BLD-MCNC: 1.09 MG/DL (ref 0.76–1.27)
GFR SERPL CREATININE-BSD FRML MDRD: 86 ML/MIN/1.73
GLUCOSE BLD-MCNC: 145 MG/DL (ref 65–99)
HCV AB SER DONR QL: NORMAL
POTASSIUM BLD-SCNC: 4.1 MMOL/L (ref 3.5–5.2)
PSA SERPL-MCNC: 0.33 NG/ML (ref 0–4)
SODIUM BLD-SCNC: 140 MMOL/L (ref 136–145)

## 2020-06-23 PROCEDURE — 86803 HEPATITIS C AB TEST: CPT | Performed by: NURSE PRACTITIONER

## 2020-06-23 PROCEDURE — G0103 PSA SCREENING: HCPCS | Performed by: NURSE PRACTITIONER

## 2020-06-23 PROCEDURE — 99214 OFFICE O/P EST MOD 30 MIN: CPT | Performed by: NURSE PRACTITIONER

## 2020-06-23 PROCEDURE — 80048 BASIC METABOLIC PNL TOTAL CA: CPT | Performed by: NURSE PRACTITIONER

## 2020-06-23 PROCEDURE — 93000 ELECTROCARDIOGRAM COMPLETE: CPT | Performed by: NURSE PRACTITIONER

## 2020-06-23 RX ORDER — ATORVASTATIN CALCIUM 10 MG/1
10 TABLET, FILM COATED ORAL NIGHTLY
Qty: 90 TABLET | Refills: 3 | Status: SHIPPED | OUTPATIENT
Start: 2020-06-23 | End: 2020-09-30 | Stop reason: SDUPTHER

## 2020-06-23 NOTE — PROGRESS NOTES
Transitional Care Follow Up Visit  Subjective     Marlo Merrill is a 50 y.o. male who presents for a transitional care management visit.    Within 48 business hours after discharge our office contacted him via telephone to coordinate his care and needs.      I reviewed and discussed the details of that call along with the discharge summary, hospital problems, inpatient lab results, inpatient diagnostic studies, and consultation reports with Marlo.     Current outpatient and discharge medications have been reconciled for the patient.  Reviewed by: WON Caraballo      Date of TCM Phone Call 6/9/2020   [facility]   Date of Admission 6/5/2020   Date of Discharge 6/9/2020   Discharge Disposition Home or Self Care     Risk for Readmission (LACE) Score: 9 (6/9/2020  6:00 AM)      History of Present Illness   Course During Hospital Stay: 06/05/20-06/09/20 [facility] admission for SOA and chronic HTN found to have new onset CHF tx IV diuretics, echo showing EF 45% stress test EF 30% CTA B pleural effusion suspect to CHF and reactive lymph nodes recommend recheck in 3-6 mo,  no prev statin, CXR borderline enlarged heart, neg B doppler, now taking amlodipine 5 mg, ASA 81 mg, coreg 25 mg BID, lasix 40 mg, inspra 50 mg and increased lisinopril 40 mg, had FU with Pema UPTON cardiology earlier today pending FU with Dr Julio cardiology next mo, stopped smoking > 1 mo ago failed patches and gum and successful cold turkey, Works  and feels burnt out plans to stay off until 07/13/20 considering different job, no prev colonoscopy or prostate ca screening, no bowel or urin sx    The following portions of the patient's history were reviewed and updated as appropriate: allergies, current medications, past family history, past medical history, past social history, past surgical history and problem list.    Review of Systems   Constitutional: Positive for fatigue. Negative for fever.    Respiratory: Negative for cough, shortness of breath and wheezing.    Cardiovascular: Negative for chest pain, palpitations and leg swelling.   Gastrointestinal: Negative for abdominal distention, abdominal pain, anal bleeding, blood in stool, constipation, diarrhea, nausea, rectal pain and vomiting.   Genitourinary: Negative for decreased urine volume, difficulty urinating, discharge, dysuria, enuresis, flank pain, frequency, genital sores, hematuria, penile pain, penile swelling, scrotal swelling, testicular pain and urgency.   Neurological: Positive for weakness. Negative for dizziness and headaches.   All other systems reviewed and are negative.      Objective   Physical Exam   Constitutional: He is oriented to person, place, and time. He appears well-developed and well-nourished.   HENT:   Head: Normocephalic and atraumatic.   Eyes: Pupils are equal, round, and reactive to light. Conjunctivae and EOM are normal.   Cardiovascular: Normal rate, regular rhythm and normal heart sounds.   Pulmonary/Chest: Effort normal and breath sounds normal.   Abdominal: Soft. He exhibits no distension and no mass. There is no tenderness. There is no rebound, no guarding and no CVA tenderness. No hernia.   Musculoskeletal: Normal range of motion. He exhibits no edema (B LE).   Neurological: He is alert and oriented to person, place, and time.   Skin: Skin is warm and dry.   Psychiatric: He has a normal mood and affect. His behavior is normal. Judgment and thought content normal.   Vitals reviewed.      Assessment/Plan   Marlo was seen today for transitional care management and congestive heart failure.    Diagnoses and all orders for this visit:    Essential hypertension  -     Basic Metabolic Panel    Chronic combined systolic and diastolic congestive heart failure (CMS/HCC)    Enlarged lymph node  -     CT Chest With Contrast; Future    Abnormal CT of the chest  -     CT Chest With Contrast; Future    Hyperlipidemia,  unspecified hyperlipidemia type    Class 1 obesity due to excess calories with serious comorbidity and body mass index (BMI) of 32.0 to 32.9 in adult    Encounter for hepatitis C screening test for low risk patient  -     Hepatitis C Antibody    Prostate cancer screening  -     PSA Screen    Colon cancer screening  -     Cologuard - Stool, Per Rectum; Future    Encounter for therapeutic drug level monitoring  -     Basic Metabolic Panel    Other orders  -     atorvastatin (LIPITOR) 10 MG tablet; Take 1 tablet by mouth Every Night.    reviewed hospital records, imaging and labs, reviewed cardiology note today, check labs and call with results, cont all chronic dz meds and check BP at home, start lipitor 10 mg HS, order CT chest recheck in 3-6 mo, Enc healthy diet and regular exercise for wt loss, congratulated on smoking cessation encourage wife to stop, order cologuard defer colonoscopy and NICOLASA, Current outpatient and discharge medications have been reconciled for the patient. Reviewed by: WON Caraballo

## 2020-06-23 NOTE — PROGRESS NOTES
Date of Office Visit: 20  Encounter Provider: WON Myers  Place of Service: [facility]  Patient Name: Marlo Merrill  :1970    Chief Complaint   Patient presents with   • Congestive Heart Failure   • Hypertension   • Follow-up   :     HPI: Marlo Merrill is a 50 y.o. male  with history of hypertension, dilated cardiomyopathy, congestive heart failure, and tobacco use.  He has family history of CABG in his father at roughly age 70 who also had congestive heart failure.    He is followed by Dr. Julio. I will visit with him for the first time today and have reviewed his medical record.     He presented to the hospital with complaint of orthopnea and lower extremity edema severe hypertension.  He has reported increased shortness of breath with exertion over the past 1-2 months he had a perfusion stress test which showed no ischemia however ejection fraction was read as 30%.  Echocardiogram showed ejection fraction of approximately 45%.  He had hypokinesis in the inferior wall.  The acute congestive heart failure was felt to be hypertension mediated.  Lower extremity bilateral duplex was negative for DVT he continued to have spikes of his blood pressure and Inspra 50 mg daily was added to amlodipine, lisinopril and carvedilol in addition to aspirin.  He did not require cardiac catheterization.  Prior to admission he was only taking lisinopril 20 mg daily.    We visit today for hospital discharge follow up.  He has been doing better able to walk without significant shortness of breath.  He is tolerating his daily living getting dressed and cleaning up better than he was prior to admission.  He has no palpitation, edema, chest pain tightness pressure, or fatigue.  He had one episode of lightheadedness last week.  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  He has not had recurrence of that.  He is tolerating his medication.  He is weighing  "himself and has lost at least a couple pounds.  He is walking a little bit more routinely still not doing structured exercise but has a goal to start \"sprinting\".  He has history of snoring denies history of witnessed apnea and he is never had a sleep study.    No Known Allergies    Past Medical History:   Diagnosis Date   • Acute combined systolic and diastolic heart failure (CMS/HCC)    • Hypertension    • Hypertensive heart disease with acute systolic congestive heart failure (CMS/HCC)    • Obesity (BMI 30-39.9)    • Orthopnea        History reviewed. No pertinent surgical history.      Family and social history reviewed.     Review of Systems   Neurological: Positive for light-headedness.     All other systems were reviewed and are negative          Objective:     Vitals:    06/23/20 1158   BP: 120/70   BP Location: Left arm   Patient Position: Sitting   Pulse: 67   Weight: 112 kg (248 lb)   Height: 188 cm (74\")     Body mass index is 31.84 kg/m².    PHYSICAL EXAM:  Physical Exam   Constitutional: He is oriented to person, place, and time. He appears well-developed and well-nourished. No distress.   HENT:   Head: Normocephalic.   Eyes: Conjunctivae are normal.   Neck: Normal range of motion. No JVD present.   Cardiovascular: Normal rate, regular rhythm, normal heart sounds and intact distal pulses.   No murmur heard.  Pulses:       Carotid pulses are 2+ on the right side, and 2+ on the left side.       Radial pulses are 2+ on the right side, and 2+ on the left side.        Posterior tibial pulses are 2+ on the right side, and 2+ on the left side.   Pulmonary/Chest: Effort normal and breath sounds normal. No respiratory distress. He has no wheezes. He has no rhonchi. He has no rales. He exhibits no tenderness.   Abdominal: Soft. Bowel sounds are normal. He exhibits no distension.   Musculoskeletal: Normal range of motion. He exhibits no edema.   Neurological: He is alert and oriented to person, place, and time. "   Skin: Skin is warm, dry and intact. No rash noted. He is not diaphoretic. No cyanosis.   Psychiatric: He has a normal mood and affect. His behavior is normal. Judgment and thought content normal.         ECG 12 Lead  Date/Time: 6/23/2020 12:11 PM  Performed by: Pema Barlow APRN  Authorized by: Pema Barlow APRN   Comparison: compared with previous ECG   Similar to previous ECG  Rhythm: sinus rhythm  Rate: normal  Conduction: 1st degree AV block  ST Depression: II, III, aVF, V5 and V6  Other findings: left ventricular hypertrophy    Clinical impression: abnormal EKG            Current Outpatient Medications   Medication Sig Dispense Refill   • amLODIPine (NORVASC) 5 MG tablet Take 1 tablet by mouth Daily. 30 tablet 0   • aspirin 81 MG EC tablet Take 1 tablet by mouth Daily.     • carvedilol (COREG) 25 MG tablet Take 1 tablet by mouth 2 (Two) Times a Day With Meals. 60 tablet 0   • eplerenone (INSPRA) 50 MG tablet Take 1 tablet by mouth Daily. 30 tablet 0   • furosemide (LASIX) 40 MG tablet Take 1 tablet by mouth 2 (Two) Times a Day. 60 tablet 0   • lisinopril (PRINIVIL,ZESTRIL) 40 MG tablet Take 1 tablet by mouth Daily. 30 tablet 0   • atorvastatin (LIPITOR) 10 MG tablet Take 1 tablet by mouth Every Night. 90 tablet 3     No current facility-administered medications for this visit.      Assessment:       Diagnosis Plan   1. Essential hypertension  ECG 12 Lead    Basic Metabolic Panel   2. Dilated cardiomyopathy (CMS/HCC)  ECG 12 Lead   3. Encounter for therapeutic drug level monitoring  Basic Metabolic Panel        Orders Placed This Encounter   Procedures   • Basic Metabolic Panel     Standing Status:   Future     Standing Expiration Date:   6/24/2021   • ECG 12 Lead     This order was created via procedure documentation         Plan:   1.  50-year-old gentleman with history of severe hypertension now on multiple antihypertensive agents and diuretic.  We will continue carvedilol 25 mg twice daily, furosemide  40 mg twice daily, lisinopril 40 mg daily, Inspra 50 mg daily, amlodipine 5 mg daily.  He is to start checking his blood pressure at home keep a list and return that with his follow-up.  2.  Cardiomyopathy ejection fraction 45% with inferior wall hypokineses felt to be hypertensive mediated recently  3.  History of tobacco abuse however he recently quit smoking I commended him for this and encouraged continued abstinence  4.  History of acute congestive heart failure and hospitalization June 2020-he is limiting his salt he is following his blood pressure at home.  His weights have been stable.  He will need follow-up BMP to reassess renal function for therapeutic drug monitoring  5.  Hyperlipidemia most recent  with new diagnosis of cardiomyopathy statin therapy should be considered-his PCP is starting him on atorvastatin 10 mg which I agree with  6.  History of snoring no witnessed apnea.  He is never had a sleep study.    Follow-up in 3 weeks call with questions or concerns.  We will continue his short-term disability through his next follow-up.            It has been a pleasure to participate in this patient's care.      Thank you,  WON Myers      **I used Dragon to dictate this note:**

## 2020-06-23 NOTE — PATIENT INSTRUCTIONS
reviewed hospital records, imaging and labs, reviewed cardiology note today, check labs and call with results, cont all chronic dz meds and check BP at home, start lipitor 10 mg HS, order CT chest recheck in 3-6 mo, Enc healthy diet and regular exercise for wt loss, congratulated on smoking cessation encourage wife to stop, order cologuard defer colonoscopy and NICOLASA, Current outpatient and discharge medications have been reconciled for the patient. Reviewed by: WON Caraballo

## 2020-06-24 ENCOUNTER — TELEPHONE (OUTPATIENT)
Dept: CARDIOLOGY | Facility: CLINIC | Age: 50
End: 2020-06-24

## 2020-06-24 NOTE — TELEPHONE ENCOUNTER
Called s/w patient. Chemistries good on current regimen. Continue all medications, follow BP, return list and bring home cuff to be checked next visit. Continue to limit salt and exercise.

## 2020-06-25 ENCOUNTER — READMISSION MANAGEMENT (OUTPATIENT)
Dept: CALL CENTER | Facility: HOSPITAL | Age: 50
End: 2020-06-25

## 2020-06-25 NOTE — OUTREACH NOTE
CHF Week 3 Survey      Responses   [facility] patient discharged from?  [place]   Does the patient have one of the following disease processes/diagnoses(primary or secondary)?  CHF   Week 3 attempt successful?  Yes   Call start time  1753   Call end time  1754   Discharge diagnosis  Hypertensive heart disease with acute systolic congestive heart failure   Is the patient taking all medications as directed (includes completed medication regime)?  Yes   Has the patient kept scheduled appointments due by today?  Yes   What is the patient's perception of their health status since discharge?  Improving   Nursing interventions  Nurse provided patient education   Is the patient weighing daily?  Yes   Daily weight interventions  Education provided on importance of daily weight   Is the patient able to teach back Heart Failure Zones?  Yes   Is the patient/caregiver able to teach back the hierarchy of who to call/visit for symptoms/problems? PCP, Specialist, Home health nurse, Urgent Care, ED, 911  Yes   CHF Week 3 call completed?  Yes   Wrap up additional comments  Pt says he is doing great, states he is doing what he is supposed to be doing.          Batool Saldana RN

## 2020-07-03 ENCOUNTER — READMISSION MANAGEMENT (OUTPATIENT)
Dept: CALL CENTER | Facility: HOSPITAL | Age: 50
End: 2020-07-03

## 2020-07-03 NOTE — OUTREACH NOTE
CHF Week 4 Survey      Responses   [facility] patient discharged from?  [place]   Does the patient have one of the following disease processes/diagnoses(primary or secondary)?  CHF   Week 4 attempt successful?  Yes   Call start time  1224   Call end time  1226   Discharge diagnosis  Hypertensive heart disease with acute systolic congestive heart failure   Is patient permission given to speak with other caregiver?  No   Meds reviewed with patient/caregiver?  Yes   Is the patient having any side effects they believe may be caused by any medication additions or changes?  No   Is the patient taking all medications as directed (includes completed medication regime)?  Yes   Has the patient kept scheduled appointments due by today?  Yes   Is the patient still receiving Home Health Services?  N/A   What is the patient's perception of their health status since discharge?  Improving   Nursing interventions  Nurse provided patient education   Is the patient weighing daily?  Yes   Does the patient have scales?  Yes   Daily weight interventions  Education provided on importance of daily weight   Is the patient able to teach back Heart Failure diet management?  Yes   Is the patient able to teach back signs and symptoms of worsening condition? (i.e. weight gain, shortness of air, etc.)  Yes   Is the patient/caregiver able to teach back the hierarchy of who to call/visit for symptoms/problems? PCP, Specialist, Home health nurse, Urgent Care, ED, 911  Yes   Additional teach back comments  -- [wt 150 lb]   Week 4 Call Completed?  Yes   Would the patient like one additional call?  No   Graduated  Yes   Did the patient feel the follow up calls were helpful during their recovery period?  Yes   Was the number of calls appropriate?  Yes   Wrap up additional comments  He states he is following the instructions and doing very well.           Jade Bragg RN

## 2020-07-09 ENCOUNTER — TELEPHONE (OUTPATIENT)
Dept: FAMILY MEDICINE CLINIC | Facility: CLINIC | Age: 50
End: 2020-07-09

## 2020-07-09 RX ORDER — FUROSEMIDE 40 MG/1
40 TABLET ORAL 2 TIMES DAILY
Qty: 180 TABLET | Refills: 3 | Status: SHIPPED | OUTPATIENT
Start: 2020-07-09 | End: 2020-07-13 | Stop reason: SDUPTHER

## 2020-07-09 RX ORDER — EPLERENONE 50 MG/1
50 TABLET, FILM COATED ORAL DAILY
Qty: 90 TABLET | Refills: 3 | Status: SHIPPED | OUTPATIENT
Start: 2020-07-09 | End: 2020-09-30 | Stop reason: SDUPTHER

## 2020-07-09 RX ORDER — LISINOPRIL 40 MG/1
40 TABLET ORAL DAILY
Qty: 90 TABLET | Refills: 3 | Status: SHIPPED | OUTPATIENT
Start: 2020-07-09 | End: 2020-09-30 | Stop reason: SDUPTHER

## 2020-07-09 RX ORDER — CARVEDILOL 25 MG/1
25 TABLET ORAL 2 TIMES DAILY WITH MEALS
Qty: 180 TABLET | Refills: 3 | Status: SHIPPED | OUTPATIENT
Start: 2020-07-09 | End: 2020-09-30 | Stop reason: SDUPTHER

## 2020-07-09 RX ORDER — AMLODIPINE BESYLATE 5 MG/1
5 TABLET ORAL DAILY
Qty: 90 TABLET | Refills: 3 | Status: SHIPPED | OUTPATIENT
Start: 2020-07-09 | End: 2020-09-30 | Stop reason: SDUPTHER

## 2020-07-09 NOTE — TELEPHONE ENCOUNTER
Caller: Marlo Merrill    Relationship: Self    Best call back number:[phone] (H)    Medication needed: carvedilol (COREG) 25 MG tablet  eplerenone (INSPRA) 50 MG tablet  lisinopril (PRINIVIL,ZESTRIL) 40 MG tablet  furosemide (LASIX) 40 MG tablet  amLODIPine (NORVASC) 5 MG tablet    Does the patient have less than a 3 day supply:  [x] Yes  [] No    What is the patient's preferred pharmacy:      [facility] AT [facility] & ([address]) - [phone] [facility] [phone] FX

## 2020-07-13 ENCOUNTER — TELEMEDICINE - AUDIO (OUTPATIENT)
Dept: CARDIOLOGY | Facility: CLINIC | Age: 50
End: 2020-07-13

## 2020-07-13 VITALS
SYSTOLIC BLOOD PRESSURE: 140 MMHG | HEIGHT: 74 IN | WEIGHT: 147.4 LBS | BODY MASS INDEX: 18.92 KG/M2 | DIASTOLIC BLOOD PRESSURE: 73 MMHG | HEART RATE: 78 BPM

## 2020-07-13 DIAGNOSIS — I11.0 HYPERTENSIVE HEART DISEASE WITH HEART FAILURE (HCC): Primary | ICD-10-CM

## 2020-07-13 PROCEDURE — 99442 PR PHYS/QHP TELEPHONE EVALUATION 11-20 MIN: CPT | Performed by: INTERNAL MEDICINE

## 2020-07-13 RX ORDER — FUROSEMIDE 40 MG/1
40 TABLET ORAL DAILY
Qty: 180 TABLET | Refills: 3 | Status: SHIPPED | OUTPATIENT
Start: 2020-07-13 | End: 2020-09-30 | Stop reason: SDUPTHER

## 2020-07-13 NOTE — PROGRESS NOTES
[facility]      Patient Name: Marlo Merrill  :1970  Age: 50 y.o.  Encounter Provider:  Zafar Julio Jr, MD      Chief Complaint:   Chief Complaint   Patient presents with   • Hypertension     This patient has consented to a telehealth visit via telephone. The visit was scheduled as a telephone visit to comply with patient safety concerns in accordance with [facility] recommendations.  All vitals recorded within this visit are reported by the patient.  I spent 30 minutes in total including but not limited to the 15 minutes spent in direct conversation with this patient.       HPI  Marlo Merrill is a 50 y.o. male with past medical history of resistant hypertension who presented to the hospital in April with heart failure presentation.  He had known about his high blood pressure but was not regularly being treated for it as he had not been compliant with previous follow-up.  He had been working long distance  and was eating out often and in a high stress work environment.  An echocardiogram was performed showing EF of 45% with regional wall motion abnormalities.  Stress study was performed showing no evidence of ischemia.  His medical regimen was optimized in-house and he was discharged on optimal medical therapy.  He followed up with WON Cooper and was doing well at that time.  Medications were continued and he continues with home blood pressure monitoring.  Blood pressure today was 140/87 but he notes that at recent doctor's visit his blood pressure was 120/80.  He is very strict with his sodium monitoring keeping his daily intake to 2000 mg or less.  He has been very conscious about optimizing his nutritional choices and is increasing his activity.  He has no symptoms with activity at this time which is a vast improvement from his time of hospitalization.  He denies angina or heart failure.  No palpitations, dizziness or syncope.  He smokes a few cigarettes per week  "but is working on stopping.  He drinks socially but states it is a rare occasion.  Family history is reviewed is not pertinent to this clinic visit.      The following portions of the patient's history were reviewed and updated as appropriate: allergies, current medications, past family history, past medical history, past social history, past surgical history and problem list.      Review of Systems   Constitution: Negative for chills and fever.   HENT: Negative for hoarse voice and sore throat.    Eyes: Negative for double vision and photophobia.   Cardiovascular: Negative for chest pain, leg swelling, near-syncope, orthopnea, palpitations, paroxysmal nocturnal dyspnea and syncope.   Respiratory: Negative for cough and wheezing.    Skin: Negative for poor wound healing and rash.   Musculoskeletal: Negative for arthritis and joint swelling.   Gastrointestinal: Negative for bloating, abdominal pain, hematemesis and hematochezia.   Neurological: Negative for dizziness and focal weakness.   Psychiatric/Behavioral: Negative for depression and suicidal ideas.       OBJECTIVE:   Vital Signs  Vitals:    07/13/20 0958   BP: 140/73   Pulse: 78     Estimated body mass index is 18.93 kg/m² as calculated from the following:    Height as of this encounter: 188 cm (74\").    Weight as of this encounter: 66.9 kg (147 lb 6.4 oz).    Physical Exam   Vitals reviewed.      Procedures          ASSESSMENT:     Resistant hypertension  Hypertensive heart disease with heart failure  Dyslipidemia    PLAN OF CARE:     1.    Hypertensive heart disease with heart failure -patient is on good medical therapy and compliant with medications.  He is doing an excellent job of modifying lifestyle and optimizing nutritional choices.  We had a long discussion about finally quitting tobacco products which he is working on now.  He is increasing activity as tolerated.  He will continue to check twice daily blood pressure log and bring his cuff into next " visit so we can compare to manual cuff monitoring.  Overall I think he is doing an excellent job and we will continue current therapy with the exception of decreasing Lasix dosing to once daily.  2.  Tobacco abuse -counseled on need for cessation and abstinence from tobacco products.  3.  Dyslipidemia -continue statin    Return to clinic 6 months           Discharge Medications           Accurate as of July 13, 2020 10:13 AM. If you have any questions, ask your nurse or doctor.               Continue These Medications      Instructions Start Date   amLODIPine 5 MG tablet  Commonly known as:  NORVASC   5 mg, Oral, Daily      aspirin 81 MG EC tablet   81 mg, Oral, Daily      atorvastatin 10 MG tablet  Commonly known as:  LIPITOR   10 mg, Oral, Nightly      carvedilol 25 MG tablet  Commonly known as:  COREG   25 mg, Oral, 2 Times Daily With Meals      eplerenone 50 MG tablet  Commonly known as:  INSPRA   50 mg, Oral, Daily      furosemide 40 MG tablet  Commonly known as:  LASIX   40 mg, Oral, 2 Times Daily      lisinopril 40 MG tablet  Commonly known as:  PRINIVIL,ZESTRIL   40 mg, Oral, Daily             Thank you for allowing me to participate in the care of your patient,      Sincerely,   Zafar Julio Jr, MD  [facility]  07/13/20  10:13

## 2020-07-20 ENCOUNTER — TELEPHONE (OUTPATIENT)
Dept: FAMILY MEDICINE CLINIC | Facility: CLINIC | Age: 50
End: 2020-07-20

## 2020-07-20 NOTE — TELEPHONE ENCOUNTER
PTS WIFE SHARON CALLED IN STATING THAT [facility] LISA SENT PAPERWORK TO THE OFFICE TO BE FILLED OUT INCLUDING PTS HISTORY WITH RENEE CHAUDHRY.  [facility] LISA SAID THEY HAVE NOT RECEIVED THIS PAPERWORK BACK  YET AND THEY NEED IT FOR PTS INSURANCE.  ANGEL SAYS THAT IF THE PAPERWORK WAS  NOT RECEIVED IN THE OFFICE IT CAN BE REFAXED IT NEEDED. TRANS LISA ALSO NEEDS A COPY OF PTS MEDICAL RECORDS FROM HIS PRIMARY PHYSICIAN  AS WELL.  THESE CAN BE FAXED ALSO.       TRANS LISA FAX    [phone]  PT CALL BACK   [phone]

## 2020-07-29 ENCOUNTER — TELEPHONE (OUTPATIENT)
Dept: FAMILY MEDICINE CLINIC | Facility: CLINIC | Age: 50
End: 2020-07-29

## 2020-08-03 NOTE — TELEPHONE ENCOUNTER
Patient informed he needs to call Medical Records to check on this, MR numbers given to patient. [phone]

## 2020-08-03 NOTE — TELEPHONE ENCOUNTER
PATIENT IS REQUESTING TO HAVE THE RECORDS SENT AGAIN TO [employer] THEY ARE STILL CLAIMING THEY DO NOT HAVE THE RECORDS FROM 4-1-2019 THROUGH 4-1-2020     PATIENT HAS FAX NUMBER [phone]     PATIENT CALL BACK NUMBER [phone] AND WOULD LIKE A CALL BACK

## 2020-09-15 ENCOUNTER — APPOINTMENT (OUTPATIENT)
Dept: CT IMAGING | Facility: HOSPITAL | Age: 50
End: 2020-09-15

## 2020-09-24 ENCOUNTER — HOSPITAL ENCOUNTER (OUTPATIENT)
Dept: CT IMAGING | Facility: HOSPITAL | Age: 50
Discharge: HOME | End: 2020-09-24
Admitting: NURSE PRACTITIONER

## 2020-09-24 DIAGNOSIS — R59.9 ENLARGED LYMPH NODE: ICD-10-CM

## 2020-09-24 DIAGNOSIS — R93.89 ABNORMAL CT OF THE CHEST: ICD-10-CM

## 2020-09-24 LAB — CREAT BLDA-MCNC: 1.1 MG/DL (ref 0.6–1.3)

## 2020-09-24 PROCEDURE — 25010000002 IOPAMIDOL 61 % SOLUTION: Performed by: NURSE PRACTITIONER

## 2020-09-24 PROCEDURE — 82565 ASSAY OF CREATININE: CPT

## 2020-09-24 PROCEDURE — 71260 CT THORAX DX C+: CPT

## 2020-09-24 RX ADMIN — IOPAMIDOL 75 ML: 612 INJECTION, SOLUTION INTRAVENOUS at 14:31

## 2020-09-30 RX ORDER — ATORVASTATIN CALCIUM 10 MG/1
10 TABLET, FILM COATED ORAL NIGHTLY
Qty: 90 TABLET | Refills: 3 | Status: SHIPPED | OUTPATIENT
Start: 2020-09-30 | End: 2021-06-08 | Stop reason: SDUPTHER

## 2020-09-30 RX ORDER — CARVEDILOL 25 MG/1
25 TABLET ORAL 2 TIMES DAILY WITH MEALS
Qty: 180 TABLET | Refills: 3 | Status: SHIPPED | OUTPATIENT
Start: 2020-09-30 | End: 2021-06-08 | Stop reason: SDUPTHER

## 2020-09-30 RX ORDER — LISINOPRIL 40 MG/1
40 TABLET ORAL DAILY
Qty: 90 TABLET | Refills: 3 | Status: SHIPPED | OUTPATIENT
Start: 2020-09-30 | End: 2021-02-04 | Stop reason: SDUPTHER

## 2020-09-30 RX ORDER — EPLERENONE 50 MG/1
50 TABLET, FILM COATED ORAL DAILY
Qty: 90 TABLET | Refills: 3 | Status: SHIPPED | OUTPATIENT
Start: 2020-09-30 | End: 2021-02-04 | Stop reason: SDUPTHER

## 2020-09-30 RX ORDER — FUROSEMIDE 40 MG/1
40 TABLET ORAL DAILY
Qty: 180 TABLET | Refills: 3 | Status: SHIPPED | OUTPATIENT
Start: 2020-09-30 | End: 2021-02-04 | Stop reason: SDUPTHER

## 2020-09-30 RX ORDER — AMLODIPINE BESYLATE 5 MG/1
5 TABLET ORAL DAILY
Qty: 90 TABLET | Refills: 3 | Status: SHIPPED | OUTPATIENT
Start: 2020-09-30 | End: 2021-02-04 | Stop reason: SDUPTHER

## 2020-09-30 NOTE — TELEPHONE ENCOUNTER
Caller: Juan Merrille    Relationship: Self    Best call back number: [phone]  Medication needed:   Requested Prescriptions     Pending Prescriptions Disp Refills   • amLODIPine (NORVASC) 5 MG tablet 90 tablet 3     Sig: Take 1 tablet by mouth Daily.   • atorvastatin (LIPITOR) 10 MG tablet 90 tablet 3     Sig: Take 1 tablet by mouth Every Night.   • carvedilol (COREG) 25 MG tablet 180 tablet 3     Sig: Take 1 tablet by mouth 2 (Two) Times a Day With Meals.   • eplerenone (INSPRA) 50 MG tablet 90 tablet 3     Sig: Take 1 tablet by mouth Daily.   • furosemide (LASIX) 40 MG tablet 180 tablet 3     Sig: Take 1 tablet by mouth Daily.   • lisinopril (PRINIVIL,ZESTRIL) 40 MG tablet 90 tablet 3     Sig: Take 1 tablet by mouth Daily.       WOULD LIKE TO KNOW IF THEY ARE GOING TO KEEP ON LASIX  PLEASE CALL AND ADVISE   Does the patient have less than a 3 day supply:  [] Yes  [x] No    What is the patient's preferred pharmacy: [facility] [address] [place] [address] [phone] [facility] [phone]

## 2021-02-04 NOTE — TELEPHONE ENCOUNTER
Caller: Marlo Merrill    Relationship: Self    Best call back number: [phone]    Medication needed:   Requested Prescriptions     Pending Prescriptions Disp Refills   • amLODIPine (NORVASC) 5 MG tablet 90 tablet 3     Sig: Take 1 tablet by mouth Daily.   • lisinopril (PRINIVIL,ZESTRIL) 40 MG tablet 90 tablet 3     Sig: Take 1 tablet by mouth Daily.   • furosemide (LASIX) 40 MG tablet 180 tablet 3     Sig: Take 1 tablet by mouth Daily.   • eplerenone (INSPRA) 50 MG tablet 90 tablet 3     Sig: Take 1 tablet by mouth Daily.   • aspirin (aspirin) 81 MG EC tablet        Sig: Take 1 tablet by mouth Daily.       When do you need the refill by: 2/4/21    What details did the patient provide when requesting the medication: N/A    Does the patient have less than a 3 day supply:  [x] Yes  [] No    What is the patient's preferred pharmacy: [facility] [address] AT [facility] & [address])  [phone] [facility] [phone] FX

## 2021-02-05 RX ORDER — AMLODIPINE BESYLATE 5 MG/1
5 TABLET ORAL DAILY
Qty: 90 TABLET | Refills: 1 | Status: SHIPPED | OUTPATIENT
Start: 2021-02-05 | End: 2021-06-08 | Stop reason: SDUPTHER

## 2021-02-05 RX ORDER — ASPIRIN 81 MG/1
81 TABLET ORAL DAILY
Qty: 90 TABLET | Refills: 1 | Status: SHIPPED | OUTPATIENT
Start: 2021-02-05 | End: 2021-06-08 | Stop reason: SDUPTHER

## 2021-02-05 RX ORDER — FUROSEMIDE 40 MG/1
40 TABLET ORAL DAILY
Qty: 180 TABLET | Refills: 1 | Status: SHIPPED | OUTPATIENT
Start: 2021-02-05 | End: 2021-06-08 | Stop reason: SDUPTHER

## 2021-02-05 RX ORDER — LISINOPRIL 40 MG/1
40 TABLET ORAL DAILY
Qty: 90 TABLET | Refills: 1 | Status: SHIPPED | OUTPATIENT
Start: 2021-02-05 | End: 2021-06-08 | Stop reason: SDUPTHER

## 2021-02-05 RX ORDER — EPLERENONE 50 MG/1
50 TABLET, FILM COATED ORAL DAILY
Qty: 90 TABLET | Refills: 1 | Status: SHIPPED | OUTPATIENT
Start: 2021-02-05 | End: 2021-06-08 | Stop reason: SDUPTHER

## 2021-02-17 ENCOUNTER — OFFICE VISIT (OUTPATIENT)
Dept: FAMILY MEDICINE CLINIC | Facility: CLINIC | Age: 51
End: 2021-02-17

## 2021-02-17 VITALS
TEMPERATURE: 96.9 F | WEIGHT: 230 LBS | OXYGEN SATURATION: 99 % | HEART RATE: 75 BPM | SYSTOLIC BLOOD PRESSURE: 156 MMHG | BODY MASS INDEX: 29.52 KG/M2 | DIASTOLIC BLOOD PRESSURE: 80 MMHG | HEIGHT: 74 IN

## 2021-02-17 DIAGNOSIS — I10 ESSENTIAL HYPERTENSION: Primary | ICD-10-CM

## 2021-02-17 DIAGNOSIS — Z12.11 COLON CANCER SCREENING: ICD-10-CM

## 2021-02-17 DIAGNOSIS — T14.8XXA MUSCLE STRAIN: ICD-10-CM

## 2021-02-17 DIAGNOSIS — I50.41 ACUTE COMBINED SYSTOLIC AND DIASTOLIC CONGESTIVE HEART FAILURE (HCC): ICD-10-CM

## 2021-02-17 DIAGNOSIS — E78.5 HYPERLIPIDEMIA, UNSPECIFIED HYPERLIPIDEMIA TYPE: ICD-10-CM

## 2021-02-17 DIAGNOSIS — R10.9 RIGHT FLANK PAIN: ICD-10-CM

## 2021-02-17 PROBLEM — E66.9 OBESITY (BMI 30-39.9): Status: RESOLVED | Noted: 2020-06-07 | Resolved: 2021-02-17

## 2021-02-17 LAB
ALBUMIN SERPL-MCNC: 4.7 G/DL (ref 3.5–5.2)
ALBUMIN/GLOB SERPL: 1.6 G/DL
ALP SERPL-CCNC: 96 U/L (ref 39–117)
ALT SERPL W P-5'-P-CCNC: 13 U/L (ref 1–41)
ANION GAP SERPL CALCULATED.3IONS-SCNC: 7.6 MMOL/L (ref 5–15)
AST SERPL-CCNC: 14 U/L (ref 1–40)
BACTERIA UR QL AUTO: ABNORMAL /HPF
BILIRUB SERPL-MCNC: 0.2 MG/DL (ref 0–1.2)
BILIRUB UR QL STRIP: NEGATIVE
BUN SERPL-MCNC: 14 MG/DL (ref 6–20)
BUN/CREAT SERPL: 12.7 (ref 7–25)
CALCIUM SPEC-SCNC: 10.1 MG/DL (ref 8.6–10.5)
CHLORIDE SERPL-SCNC: 106 MMOL/L (ref 98–107)
CLARITY UR: CLEAR
CO2 SERPL-SCNC: 28.4 MMOL/L (ref 22–29)
COLOR UR: YELLOW
CREAT SERPL-MCNC: 1.1 MG/DL (ref 0.76–1.27)
ERYTHROCYTE [DISTWIDTH] IN BLOOD BY AUTOMATED COUNT: 14.6 % (ref 12.3–15.4)
GFR SERPL CREATININE-BSD FRML MDRD: 86 ML/MIN/1.73
GLOBULIN UR ELPH-MCNC: 2.9 GM/DL
GLUCOSE SERPL-MCNC: 93 MG/DL (ref 65–99)
GLUCOSE UR STRIP-MCNC: NEGATIVE MG/DL
HCT VFR BLD AUTO: 44.1 % (ref 37.5–51)
HGB BLD-MCNC: 14.4 G/DL (ref 13–17.7)
HGB UR QL STRIP.AUTO: ABNORMAL
KETONES UR QL STRIP: NEGATIVE
LEUKOCYTE ESTERASE UR QL STRIP.AUTO: NEGATIVE
LYMPHOCYTES # BLD AUTO: 3.3 10*3/MM3 (ref 0.7–3.1)
LYMPHOCYTES NFR BLD AUTO: 44.3 % (ref 19.6–45.3)
MCH RBC QN AUTO: 27.5 PG (ref 26.6–33)
MCHC RBC AUTO-ENTMCNC: 32.5 G/DL (ref 31.5–35.7)
MCV RBC AUTO: 84.4 FL (ref 79–97)
MONOCYTES # BLD AUTO: 0.4 10*3/MM3 (ref 0.1–0.9)
MONOCYTES NFR BLD AUTO: 5.3 % (ref 5–12)
NEUTROPHILS NFR BLD AUTO: 3.7 10*3/MM3 (ref 1.7–7)
NEUTROPHILS NFR BLD AUTO: 50.4 % (ref 42.7–76)
NITRITE UR QL STRIP: NEGATIVE
PH UR STRIP.AUTO: 6.5 [PH] (ref 4.6–8)
PLATELET # BLD AUTO: 337 10*3/MM3 (ref 140–450)
PMV BLD AUTO: 8.6 FL (ref 6–12)
POTASSIUM SERPL-SCNC: 4.5 MMOL/L (ref 3.5–5.2)
PROT SERPL-MCNC: 7.6 G/DL (ref 6–8.5)
PROT UR QL STRIP: NEGATIVE
RBC # BLD AUTO: 5.23 10*6/MM3 (ref 4.14–5.8)
RBC # UR: ABNORMAL /HPF
REF LAB TEST METHOD: ABNORMAL
SODIUM SERPL-SCNC: 142 MMOL/L (ref 136–145)
SP GR UR STRIP: >=1.03 (ref 1–1.03)
SQUAMOUS #/AREA URNS HPF: ABNORMAL /HPF
TSH SERPL DL<=0.05 MIU/L-ACNC: 0.99 UIU/ML (ref 0.27–4.2)
UROBILINOGEN UR QL STRIP: ABNORMAL
WBC # BLD AUTO: 7.4 10*3/MM3 (ref 3.4–10.8)
WBC UR QL AUTO: ABNORMAL /HPF

## 2021-02-17 PROCEDURE — 84443 ASSAY THYROID STIM HORMONE: CPT | Performed by: NURSE PRACTITIONER

## 2021-02-17 PROCEDURE — 81001 URINALYSIS AUTO W/SCOPE: CPT | Performed by: NURSE PRACTITIONER

## 2021-02-17 PROCEDURE — 99214 OFFICE O/P EST MOD 30 MIN: CPT | Performed by: NURSE PRACTITIONER

## 2021-02-17 PROCEDURE — 80053 COMPREHEN METABOLIC PANEL: CPT | Performed by: NURSE PRACTITIONER

## 2021-02-17 PROCEDURE — 36415 COLL VENOUS BLD VENIPUNCTURE: CPT | Performed by: NURSE PRACTITIONER

## 2021-02-17 PROCEDURE — 85025 COMPLETE CBC W/AUTO DIFF WBC: CPT | Performed by: NURSE PRACTITIONER

## 2021-02-17 NOTE — PROGRESS NOTES
"Chief Complaint  Flank Pain (RIGHT SIDE) and Follow-up (fluid around heart)    Subjective          Marlo Merrill presents to [facility] PRIMARY [facility]  History of Present Illness  Here to FU on chronic HTN and heart failure on amlodipine 5 mg, ASA 81 mg, coreg 25 mg BID, inspra 50 mg, lasix 40 mg, lisinopril 40 mg, last stress echo 06/20 showing reduced EF 30% and cardiomegaly stable on CT chest 09/20, last saw cardiology Dr Julio 07/20 telemedicine, not checking BP at home and recently ran out of inspra d/t cost, With chronic chol on lipitor 10 mg last lipid 06/20 nonfasting today, Still smoking   Never got cologuard box no bowel sx doesn't want colonoscopy, no urin sx last PSA 06/20 normal, working on healthy diet and exercise weight loss 22 lbs since 06/20  Recently twisted when cooking earlier this week with some lingering right sided back and flank pain pain 10/10 down to 3/10 OTC IBU helped, request note to RTW    Review of Systems   Cardiovascular: Negative for chest pain, palpitations and leg swelling.   Gastrointestinal: Negative for abdominal distention, abdominal pain, anal bleeding, blood in stool, constipation, diarrhea, nausea, rectal pain and vomiting.   Genitourinary: Positive for flank pain. Negative for decreased urine volume, difficulty urinating, discharge, dysuria, enuresis, frequency, genital sores, hematuria, penile pain, penile swelling, scrotal swelling, testicular pain and urgency.   Musculoskeletal: Positive for arthralgias, back pain and myalgias.   Neurological: Negative for dizziness and light-headedness.   All other systems reviewed and are negative.    Objective   Vital Signs:   /80 (BP Location: Left arm, Patient Position: Sitting, Cuff Size: Large Adult)   Pulse 75   Temp 96.9 °F (36.1 °C) (Infrared)   Ht 188 cm (74.02\")   Wt 104 kg (230 lb)   SpO2 99%   BMI 29.52 kg/m²     Physical Exam  Vitals signs reviewed.   Constitutional:       Appearance: He " is well-developed.   HENT:      Head: Normocephalic and atraumatic.   Eyes:      Conjunctiva/sclera: Conjunctivae normal.      Pupils: Pupils are equal, round, and reactive to light.   Neck:      Musculoskeletal: Normal range of motion.   Cardiovascular:      Rate and Rhythm: Normal rate and regular rhythm.      Pulses: Normal pulses.      Heart sounds: Normal heart sounds.   Pulmonary:      Effort: Pulmonary effort is normal.      Breath sounds: Normal breath sounds.   Abdominal:      General: There is no distension.      Palpations: Abdomen is soft. There is no mass.      Tenderness: There is no abdominal tenderness. There is right CVA tenderness. There is no left CVA tenderness, guarding or rebound.      Hernia: No hernia is present.   Musculoskeletal: Normal range of motion.         General: Tenderness (right sided lower thoracic) present.      Right lower leg: No edema.      Left lower leg: No edema.   Skin:     General: Skin is warm and dry.   Neurological:      Mental Status: He is alert and oriented to person, place, and time.   Psychiatric:         Mood and Affect: Mood normal.         Behavior: Behavior normal.         Thought Content: Thought content normal.         Judgment: Judgment normal.        Result Review :                 Assessment and Plan    Diagnoses and all orders for this visit:    1. Essential hypertension (Primary)  -     CBC & Differential  -     Comprehensive Metabolic Panel  -     TSH  -     Urinalysis With Microscopic - Urine, Clean Catch  -     Lipid Panel; Future  -     CBC Auto Differential  -     Urinalysis without microscopic (no culture) - Urine, Clean Catch  -     Urinalysis, Microscopic Only - Urine, Clean Catch    2. Acute combined systolic and diastolic congestive heart failure (CMS/HCC)  -     CBC & Differential  -     Comprehensive Metabolic Panel  -     TSH  -     Urinalysis With Microscopic - Urine, Clean Catch  -     Lipid Panel; Future  -     CBC Auto Differential  -      Urinalysis without microscopic (no culture) - Urine, Clean Catch  -     Urinalysis, Microscopic Only - Urine, Clean Catch    3. Hyperlipidemia, unspecified hyperlipidemia type  -     Lipid Panel; Future    4. Colon cancer screening  -     Cologuard - Stool, Per Rectum; Future    5. Right flank pain    6. Muscle strain        Follow Up   No follow-ups on file.  Patient was given instructions and counseling regarding his condition or for health maintenance advice. Please see specific information pulled into the AVS if appropriate.   Check labs and call with results, cont FU with cardiology and check BP at home, congratulated on weight loss cont healthy diet and exercise for wt loss, cont chronic dz meds RTC fasting check chol, order cologuard defer colonoscopy, suspect muscle strain and monitor as improving, note for work given

## 2021-02-17 NOTE — PATIENT INSTRUCTIONS
Check labs and call with results, cont FU with cardiology and check BP at home, congratulated on weight loss cont healthy diet and exercise for wt loss, cont chronic dz meds RTC fasting check chol, order cologuard defer colonoscopy, suspect muscle strain and monitor as improving, note for work given

## 2021-02-22 ENCOUNTER — TELEPHONE (OUTPATIENT)
Dept: FAMILY MEDICINE CLINIC | Facility: CLINIC | Age: 51
End: 2021-02-22

## 2021-03-25 ENCOUNTER — TELEPHONE (OUTPATIENT)
Dept: FAMILY MEDICINE CLINIC | Facility: CLINIC | Age: 51
End: 2021-03-25

## 2021-03-25 DIAGNOSIS — R06.83 SNORING: ICD-10-CM

## 2021-03-25 DIAGNOSIS — G47.33 OSA (OBSTRUCTIVE SLEEP APNEA): Primary | ICD-10-CM

## 2021-03-25 NOTE — TELEPHONE ENCOUNTER
Caller: Marlo Merrill    Relationship: Self    Best call back number: [phone]    What medication are you requesting: CPAP MACHINE    What are your current symptoms: PATIENT'S SNORING HAS WORSENED, TROUBLES BREATHING.       If a prescription is needed, what is your preferred pharmacy and phone number:      Additional [facility] [address] AT [facility] & (MAYA [place]) - [phone] [facility] [phone] FX        ADDITIONAL: PLEASE CALL PATIENT TO DISCUSS CPAP MACHINE OPTIONS AND NEW MEDICATION PRESCRIBED BY HIS CARDIOLOGIST.

## 2021-03-25 NOTE — TELEPHONE ENCOUNTER
Patient would like a at home sleep study ordered please.  And disregard the medication question that is for his Cardiologist regarding [email or web address]

## 2021-05-20 ENCOUNTER — TELEPHONE (OUTPATIENT)
Dept: FAMILY MEDICINE CLINIC | Facility: CLINIC | Age: 51
End: 2021-05-20

## 2021-05-20 NOTE — TELEPHONE ENCOUNTER
Reviewed meds and no allergies, please get COVID-19 vaccine, he can get in our parking lot if he wants without appt

## 2021-05-20 NOTE — TELEPHONE ENCOUNTER
PATIENT CALLED IN REQUESTING A CALL BACK FROM THE NURSE REGARDING THE COVID VACCINE.    PATIENT WANTS TO KNOW IF IT WOULD BE SAFE FOR HIM TO TAKE THE VACCINE WITH HIS MEDICAL HISTORY.    BEST CALL BACK # [phone]

## 2021-06-08 ENCOUNTER — TELEPHONE (OUTPATIENT)
Dept: FAMILY MEDICINE CLINIC | Facility: CLINIC | Age: 51
End: 2021-06-08

## 2021-06-08 RX ORDER — ASPIRIN 81 MG/1
81 TABLET ORAL DAILY
Qty: 90 TABLET | Refills: 1 | Status: SHIPPED | OUTPATIENT
Start: 2021-06-08 | End: 2021-09-16 | Stop reason: SDUPTHER

## 2021-06-08 RX ORDER — CARVEDILOL 25 MG/1
25 TABLET ORAL 2 TIMES DAILY WITH MEALS
Qty: 180 TABLET | Refills: 3 | Status: SHIPPED | OUTPATIENT
Start: 2021-06-08 | End: 2021-09-16 | Stop reason: SDUPTHER

## 2021-06-08 RX ORDER — EPLERENONE 50 MG/1
50 TABLET, FILM COATED ORAL DAILY
Qty: 90 TABLET | Refills: 1 | Status: SHIPPED | OUTPATIENT
Start: 2021-06-08 | End: 2022-12-02 | Stop reason: SDUPTHER

## 2021-06-08 RX ORDER — FUROSEMIDE 40 MG/1
40 TABLET ORAL DAILY
Qty: 180 TABLET | Refills: 1 | Status: SHIPPED | OUTPATIENT
Start: 2021-06-08 | End: 2021-09-16 | Stop reason: SDUPTHER

## 2021-06-08 RX ORDER — AMLODIPINE BESYLATE 5 MG/1
5 TABLET ORAL DAILY
Qty: 90 TABLET | Refills: 1 | Status: SHIPPED | OUTPATIENT
Start: 2021-06-08 | End: 2022-08-22 | Stop reason: SDUPTHER

## 2021-06-08 RX ORDER — ATORVASTATIN CALCIUM 10 MG/1
10 TABLET, FILM COATED ORAL NIGHTLY
Qty: 90 TABLET | Refills: 3 | Status: SHIPPED | OUTPATIENT
Start: 2021-06-08 | End: 2021-09-16 | Stop reason: SDUPTHER

## 2021-06-08 RX ORDER — LISINOPRIL 40 MG/1
40 TABLET ORAL DAILY
Qty: 90 TABLET | Refills: 1 | Status: SHIPPED | OUTPATIENT
Start: 2021-06-08 | End: 2021-09-16 | Stop reason: SDUPTHER

## 2021-06-08 NOTE — TELEPHONE ENCOUNTER
Caller: Marlo Merrill    Relationship: Self    Best call back number: [phone]    Medication needed:   Requested Prescriptions     Pending Prescriptions Disp Refills   • lisinopril (PRINIVIL,ZESTRIL) 40 MG tablet 90 tablet 1     Sig: Take 1 tablet by mouth Daily.   • furosemide (LASIX) 40 MG tablet 180 tablet 1     Sig: Take 1 tablet by mouth Daily.   • eplerenone (INSPRA) 50 MG tablet 90 tablet 1     Sig: Take 1 tablet by mouth Daily.   • carvedilol (COREG) 25 MG tablet 180 tablet 3     Sig: Take 1 tablet by mouth 2 (Two) Times a Day With Meals.   • atorvastatin (LIPITOR) 10 MG tablet 90 tablet 3     Sig: Take 1 tablet by mouth Every Night.   • aspirin (aspirin) 81 MG EC tablet 90 tablet 1     Sig: Take 1 tablet by mouth Daily.   • amLODIPine (NORVASC) 5 MG tablet 90 tablet 1     Sig: Take 1 tablet by mouth Daily.       When do you need the refill by: 06/08    What additional details did the patient provide when requesting the medication: COMPLETELY OUT OF FUROSEMIDE AND HAS A WEEK APPROX OF THE OTHERS.    Does the patient have less than a 3 day supply:  [x] Yes  [] No    What is the patient's preferred pharmacy[facility] [address] [place] [address] [address] [facility] AT [facility] & [address]) - [phone] [facility] [phone] FX

## 2021-09-15 ENCOUNTER — HOSPITAL ENCOUNTER (EMERGENCY)
Facility: HOSPITAL | Age: 51
Discharge: HOME | End: 2021-09-15
Attending: EMERGENCY MEDICINE | Admitting: EMERGENCY MEDICINE

## 2021-09-15 ENCOUNTER — APPOINTMENT (OUTPATIENT)
Dept: GENERAL RADIOLOGY | Facility: HOSPITAL | Age: 51
End: 2021-09-15

## 2021-09-15 VITALS
WEIGHT: 223 LBS | TEMPERATURE: 98.9 F | OXYGEN SATURATION: 100 % | BODY MASS INDEX: 28.62 KG/M2 | HEART RATE: 64 BPM | DIASTOLIC BLOOD PRESSURE: 100 MMHG | RESPIRATION RATE: 18 BRPM | HEIGHT: 74 IN | SYSTOLIC BLOOD PRESSURE: 175 MMHG

## 2021-09-15 DIAGNOSIS — R07.9 CHEST PAIN, UNSPECIFIED TYPE: Primary | ICD-10-CM

## 2021-09-15 LAB
ALBUMIN SERPL-MCNC: 4.5 G/DL (ref 3.5–5.2)
ALBUMIN/GLOB SERPL: 1.5 G/DL
ALP SERPL-CCNC: 85 U/L (ref 39–117)
ALT SERPL W P-5'-P-CCNC: 16 U/L (ref 1–41)
ANION GAP SERPL CALCULATED.3IONS-SCNC: 8.6 MMOL/L (ref 5–15)
AST SERPL-CCNC: 15 U/L (ref 1–40)
BASOPHILS # BLD AUTO: 0.03 10*3/MM3 (ref 0–0.2)
BASOPHILS NFR BLD AUTO: 0.3 % (ref 0–1.5)
BILIRUB SERPL-MCNC: 0.3 MG/DL (ref 0–1.2)
BUN SERPL-MCNC: 14 MG/DL (ref 6–20)
BUN/CREAT SERPL: 13.1 (ref 7–25)
CALCIUM SPEC-SCNC: 9.2 MG/DL (ref 8.6–10.5)
CHLORIDE SERPL-SCNC: 106 MMOL/L (ref 98–107)
CO2 SERPL-SCNC: 25.4 MMOL/L (ref 22–29)
CREAT SERPL-MCNC: 1.07 MG/DL (ref 0.76–1.27)
DEPRECATED RDW RBC AUTO: 45.8 FL (ref 37–54)
EOSINOPHIL # BLD AUTO: 0.11 10*3/MM3 (ref 0–0.4)
EOSINOPHIL NFR BLD AUTO: 1.2 % (ref 0.3–6.2)
ERYTHROCYTE [DISTWIDTH] IN BLOOD BY AUTOMATED COUNT: 14.5 % (ref 12.3–15.4)
GFR SERPL CREATININE-BSD FRML MDRD: 88 ML/MIN/1.73
GLOBULIN UR ELPH-MCNC: 3 GM/DL
GLUCOSE SERPL-MCNC: 82 MG/DL (ref 65–99)
HCT VFR BLD AUTO: 44 % (ref 37.5–51)
HGB BLD-MCNC: 14.2 G/DL (ref 13–17.7)
HOLD SPECIMEN: NORMAL
HOLD SPECIMEN: NORMAL
IMM GRANULOCYTES # BLD AUTO: 0.02 10*3/MM3 (ref 0–0.05)
IMM GRANULOCYTES NFR BLD AUTO: 0.2 % (ref 0–0.5)
LYMPHOCYTES # BLD AUTO: 3.56 10*3/MM3 (ref 0.7–3.1)
LYMPHOCYTES NFR BLD AUTO: 38.9 % (ref 19.6–45.3)
MCH RBC QN AUTO: 28.1 PG (ref 26.6–33)
MCHC RBC AUTO-ENTMCNC: 32.3 G/DL (ref 31.5–35.7)
MCV RBC AUTO: 87 FL (ref 79–97)
MONOCYTES # BLD AUTO: 0.87 10*3/MM3 (ref 0.1–0.9)
MONOCYTES NFR BLD AUTO: 9.5 % (ref 5–12)
NEUTROPHILS NFR BLD AUTO: 4.56 10*3/MM3 (ref 1.7–7)
NEUTROPHILS NFR BLD AUTO: 49.9 % (ref 42.7–76)
NRBC BLD AUTO-RTO: 0 /100 WBC (ref 0–0.2)
PLATELET # BLD AUTO: 346 10*3/MM3 (ref 140–450)
PMV BLD AUTO: 11.1 FL (ref 6–12)
POTASSIUM SERPL-SCNC: 4.1 MMOL/L (ref 3.5–5.2)
PROT SERPL-MCNC: 7.5 G/DL (ref 6–8.5)
QT INTERVAL: 437 MS
RBC # BLD AUTO: 5.06 10*6/MM3 (ref 4.14–5.8)
SODIUM SERPL-SCNC: 140 MMOL/L (ref 136–145)
TROPONIN T SERPL-MCNC: <0.01 NG/ML (ref 0–0.03)
TROPONIN T SERPL-MCNC: <0.01 NG/ML (ref 0–0.03)
WBC # BLD AUTO: 9.15 10*3/MM3 (ref 3.4–10.8)
WHOLE BLOOD HOLD SPECIMEN: NORMAL
WHOLE BLOOD HOLD SPECIMEN: NORMAL

## 2021-09-15 PROCEDURE — 93005 ELECTROCARDIOGRAM TRACING: CPT

## 2021-09-15 PROCEDURE — 84484 ASSAY OF TROPONIN QUANT: CPT

## 2021-09-15 PROCEDURE — 85025 COMPLETE CBC W/AUTO DIFF WBC: CPT

## 2021-09-15 PROCEDURE — 93010 ELECTROCARDIOGRAM REPORT: CPT | Performed by: INTERNAL MEDICINE

## 2021-09-15 PROCEDURE — 99282 EMERGENCY DEPT VISIT SF MDM: CPT

## 2021-09-15 PROCEDURE — 80053 COMPREHEN METABOLIC PANEL: CPT

## 2021-09-15 PROCEDURE — 93005 ELECTROCARDIOGRAM TRACING: CPT | Performed by: EMERGENCY MEDICINE

## 2021-09-15 PROCEDURE — 99283 EMERGENCY DEPT VISIT LOW MDM: CPT

## 2021-09-15 PROCEDURE — 71046 X-RAY EXAM CHEST 2 VIEWS: CPT

## 2021-09-15 PROCEDURE — 36415 COLL VENOUS BLD VENIPUNCTURE: CPT

## 2021-09-15 RX ORDER — SODIUM CHLORIDE 0.9 % (FLUSH) 0.9 %
10 SYRINGE (ML) INJECTION AS NEEDED
Status: DISCONTINUED | OUTPATIENT
Start: 2021-09-15 | End: 2021-09-15 | Stop reason: HOSPADM

## 2021-09-15 RX ORDER — ASPIRIN 325 MG
325 TABLET ORAL ONCE
Status: DISCONTINUED | OUTPATIENT
Start: 2021-09-15 | End: 2021-09-15 | Stop reason: HOSPADM

## 2021-09-15 NOTE — ED TRIAGE NOTES
Patient to er from [facility] urgent care with c/o chest pain that started two days ago. Patient reported no soa with this. Patient has mask on in triage along with staff.

## 2021-09-15 NOTE — ED PROVIDER NOTES
EMERGENCY DEPARTMENT ENCOUNTER    Room Number:  23/23  Date of encounter:  9/15/2021  PCP: Kaya Park APRN  Historian: Patient     I used full protective equipment while examining this patient.  This includes face mask, gloves and protective eyewear.  I washed my hands before entering the room and immediately upon leaving the room      HPI:  Chief Complaint: Chest pain  A complete HPI/ROS/PMH/PSH/SH/FH are unobtainable due to: None    Context: Marlo Merrill is a 51 y.o. male who presents to the ED c/o chest pain.  He had chest discomfort earlier this morning around 8 or 9:00.  The pain lasted about 1 or 2 hours.  He went initially to a [facility] facility but changed her mind and ended up coming here.  He has been pain-free since at least 1:00 this afternoon.  Pain is described as a dull pressure in the mid chest.  It was worsened while he was working today.  Patient states that he had a stress test about a year ago that was normal.  Patient works in waste disposal.  He unfortunately has resumed smoking recently.  He does report family history of coronary artery disease.      MEDICAL RECORD REVIEW  I reviewed prior medical records including televisit with Dr. Julio in July 2020.  Patient had echo that showed EF of 45% with regional wall motion abnormalities stress study was negative for ischemia.    PAST MEDICAL HISTORY  Active Ambulatory Problems     Diagnosis Date Noted   • Hypertension 02/15/2017   • Orthopnea 06/05/2020   • Acute combined systolic and diastolic congestive heart failure (CMS/HCC) 06/05/2020   • Leg swelling 06/05/2020   • Lymph nodes enlarged 06/05/2020   • Hypertensive heart disease with acute systolic congestive heart failure (CMS/HCC) 06/07/2020     Resolved Ambulatory Problems     Diagnosis Date Noted   • Obesity (BMI 30-39.9) 06/07/2020     Past Medical History:   Diagnosis Date   • Acute combined systolic and diastolic heart failure (CMS/HCC)          PAST SURGICAL  HISTORY  History reviewed. No pertinent surgical history.      FAMILY HISTORY  Family History   Problem Relation Age of Onset   • Hypertension Mother    • Diabetes Mother    • Heart disease Father         stent   • No Known Problems Sister    • No Known Problems Brother    • No Known Problems Maternal Grandmother    • No Known Problems Maternal Grandfather    • No Known Problems Paternal Grandmother    • No Known Problems Paternal Grandfather          SOCIAL HISTORY  Social History     Socioeconomic History   • Marital status:      Spouse name: Not on file   • Number of children: Not on file   • Years of education: Not on file   • Highest education level: Not on file   Tobacco Use   • Smoking status: Current Every Day Smoker     Packs/day: 0.50     Years: 35.00     Pack years: 17.50     Types: Cigarettes   • Smokeless tobacco: Never Used   • Tobacco comment:  DAILY CAFFEINE USE   Substance and Sexual Activity   • Alcohol use: Yes     Comment: occ   • Drug use: No         ALLERGIES  Patient has no known allergies.       REVIEW OF SYSTEMS  Review of Systems   Constitutional: Negative.  Negative for fever.   HENT: Negative.  Negative for sore throat.    Eyes: Negative.    Respiratory: Negative.  Negative for cough.    Cardiovascular: Positive for chest pain.   Gastrointestinal: Negative.    Genitourinary: Negative.  Negative for dysuria.   Musculoskeletal: Negative.  Negative for back pain.   Skin: Negative.  Negative for rash.   Neurological: Negative.  Negative for headaches.   All other systems reviewed and are negative.          PHYSICAL EXAM    I have reviewed the triage vital signs and nursing notes.    ED Triage Vitals   Temp Heart Rate Resp BP SpO2   09/15/21 1506 09/15/21 1506 09/15/21 1538 09/15/21 1538 09/15/21 1506   98.9 °F (37.2 °C) 75 18 175/100 100 %      Temp src Heart Rate Source Patient Position BP Location FiO2 (%)   09/15/21 1506 09/15/21 1538 09/15/21 1538 09/15/21 1538 --   Temporal  Monitor Sitting Left arm        Physical Exam  GENERAL: Alert male in no obvious distress.  Triage vitals reviewed notable for elevated blood pressure of 175/100.  HENT: nares patent  EYES: no scleral icterus  CV: regular rhythm, regular rate no murmur  RESPIRATORY: normal effort, clear to auscultation bilaterally  ABDOMEN: soft, nontender  MUSCULOSKELETAL: no deformity-no skin swelling or tenderness to palpation  NEURO: Strength, sensation, and coordination are grossly intact.  Speech and mentation are unremarkable  SKIN: warm, dry      LAB RESULTS  Recent Results (from the past 24 hour(s))   ECG 12 Lead    Collection Time: 09/15/21  3:15 PM   Result Value Ref Range    QT Interval 437 ms   Comprehensive Metabolic Panel    Collection Time: 09/15/21  3:23 PM    Specimen: Blood   Result Value Ref Range    Glucose 82 65 - 99 mg/dL    BUN 14 6 - 20 mg/dL    Creatinine 1.07 0.76 - 1.27 mg/dL    Sodium 140 136 - 145 mmol/L    Potassium 4.1 3.5 - 5.2 mmol/L    Chloride 106 98 - 107 mmol/L    CO2 25.4 22.0 - 29.0 mmol/L    Calcium 9.2 8.6 - 10.5 mg/dL    Total Protein 7.5 6.0 - 8.5 g/dL    Albumin 4.50 3.50 - 5.20 g/dL    ALT (SGPT) 16 1 - 41 U/L    AST (SGOT) 15 1 - 40 U/L    Alkaline Phosphatase 85 39 - 117 U/L    Total Bilirubin 0.3 0.0 - 1.2 mg/dL    eGFR  African Amer 88 >60 mL/min/1.73    Globulin 3.0 gm/dL    A/G Ratio 1.5 g/dL    BUN/Creatinine Ratio 13.1 7.0 - 25.0    Anion Gap 8.6 5.0 - 15.0 mmol/L   Troponin    Collection Time: 09/15/21  3:23 PM    Specimen: Blood   Result Value Ref Range    Troponin T <0.010 0.000 - 0.030 ng/mL   Green Top (Gel)    Collection Time: 09/15/21  3:23 PM   Result Value Ref Range    Extra Tube Hold for add-ons.    Lavender Top    Collection Time: 09/15/21  3:23 PM   Result Value Ref Range    Extra Tube hold for add-on    Gold Top - SST    Collection Time: 09/15/21  3:23 PM   Result Value Ref Range    Extra Tube Hold for add-ons.    Light Blue Top    Collection Time: 09/15/21  3:23 PM    Result Value Ref Range    Extra Tube hold for add-on    CBC Auto Differential    Collection Time: 09/15/21  3:23 PM    Specimen: Blood   Result Value Ref Range    WBC 9.15 3.40 - 10.80 10*3/mm3    RBC 5.06 4.14 - 5.80 10*6/mm3    Hemoglobin 14.2 13.0 - 17.7 g/dL    Hematocrit 44.0 37.5 - 51.0 %    MCV 87.0 79.0 - 97.0 fL    MCH 28.1 26.6 - 33.0 pg    MCHC 32.3 31.5 - 35.7 g/dL    RDW 14.5 12.3 - 15.4 %    RDW-SD 45.8 37.0 - 54.0 fl    MPV 11.1 6.0 - 12.0 fL    Platelets 346 140 - 450 10*3/mm3    Neutrophil % 49.9 42.7 - 76.0 %    Lymphocyte % 38.9 19.6 - 45.3 %    Monocyte % 9.5 5.0 - 12.0 %    Eosinophil % 1.2 0.3 - 6.2 %    Basophil % 0.3 0.0 - 1.5 %    Immature Grans % 0.2 0.0 - 0.5 %    Neutrophils, Absolute 4.56 1.70 - 7.00 10*3/mm3    Lymphocytes, Absolute 3.56 (H) 0.70 - 3.10 10*3/mm3    Monocytes, Absolute 0.87 0.10 - 0.90 10*3/mm3    Eosinophils, Absolute 0.11 0.00 - 0.40 10*3/mm3    Basophils, Absolute 0.03 0.00 - 0.20 10*3/mm3    Immature Grans, Absolute 0.02 0.00 - 0.05 10*3/mm3    nRBC 0.0 0.0 - 0.2 /100 WBC   Troponin    Collection Time: 09/15/21  6:17 PM    Specimen: Blood   Result Value Ref Range    Troponin T <0.010 0.000 - 0.030 ng/mL       Ordered the above labs and independently reviewed the results.      RADIOLOGY  XR Chest 2 View    Result Date: 9/15/2021  PA AND LATERAL CHEST  CLINICAL HISTORY: Chest pain  The lungs are well-expanded and appear free of infiltrates. There are no pleural effusions. The cardiomediastinal silhouette is unremarkable. There is minimal smooth levoscoliosis.  IMPRESSIONS: No evidence of active disease within the chest.  This report was finalized on 9/15/2021 3:46 PM by Dr. Josh Sin M.D.        I ordered the above noted radiological studies. Reviewed by me and discussed with radiologist.  See dictation for official radiology interpretation.      PROCEDURES  Procedures      MEDICATIONS GIVEN IN ER    Medications   sodium chloride 0.9 % flush 10 mL (has no  administration in time range)   aspirin tablet 325 mg (325 mg Oral Not Given 9/15/21 1903)         PROGRESS, DATA ANALYSIS, CONSULTS, AND MEDICAL DECISION MAKING    All labs have been independently reviewed by me.  All radiology studies have been reviewed by me and discussed with radiologist dictating the report.   EKG's independently viewed and interpreted by me.  Discussion below represents my analysis of pertinent findings related to patient's condition, differential diagnosis, treatment plan and final disposition.      ED Course as of Sep 15 1945   Wed Sep 15, 2021   1928 EKG          EKG time: 1515  Rhythm/Rate: Sinus 57  P waves and MI: Normal P waves and MI intervals  QRS, axis: Left axis deviation, LAFB  ST and T waves: Unremarkable ST and T wave    Interpreted Contemporaneously by me, independently viewed  Compared to 2020 T wave inversions are improved      [DB]   1943 [id number]old male with history of hypertension, smoking and family history of coronary artery disease presents with episode of chest pain early this morning around 8 or 9:00.  Troponin drawn about 6 hours after the onset of pain was normal.  EKG is improved from prior in 2020.  Patient did have a negative stress test in June 2020.    MDM-differential diagnosis includes cardiac, pulmonary, GI and musculoskeletal causes.  Patient has not ED HEART SCORE of 4 with negative troponin, drawn 6 hours after the onset of pain.  He is currently pain-free and I did offer and recommended admission.  Patient would rather go home and follow-up with [place] cardiology as outpatient.  Since he did have a negative stress test about a year ago and is pain-free with negative troponin and improved EKG I think that this is not unreasonable.    [DB]      ED Course User Index  [DB] Josh Neil MD       AS OF 19:45 EDT VITALS:    BP - 175/100  HR - 64  TEMP - 98.9 °F (37.2 °C) (Temporal)  O2 SATS - 100%      DIAGNOSIS  Final diagnoses:   Chest pain,  unspecified type         DISPOSITION  DISCHARGE    Patient discharged in stable condition.    Reviewed implications of results, diagnosis, meds, responsibility to follow up, warning signs and symptoms of possible worsening, potential complications and reasons to return to ER, including increased chest pain, shortness breath or as needed.    Patient/Family voiced understanding of above instructions.    Discussed plan for discharge, as there is no emergent indication for admission. Patient referred to primary care provider for BP management due to today's BP. Pt/family is agreeable and understands need for follow up and repeat testing.  Pt is aware that discharge does not mean that nothing is wrong but it indicates no emergency is present that requires admission and they must continue care with follow-up as given below or physician of their choice.     FOLLOW-UP  [facility]  [address] [facility] [address] [phone]             Medication List      No changes were made to your prescriptions during this visit.                Josh Neil MD  09/15/21 1949

## 2021-09-16 RX ORDER — LISINOPRIL 40 MG/1
40 TABLET ORAL DAILY
Qty: 90 TABLET | Refills: 1 | Status: SHIPPED | OUTPATIENT
Start: 2021-09-16 | End: 2022-12-02 | Stop reason: SDUPTHER

## 2021-09-16 RX ORDER — FUROSEMIDE 40 MG/1
40 TABLET ORAL DAILY
Qty: 180 TABLET | Refills: 1 | Status: SHIPPED | OUTPATIENT
Start: 2021-09-16 | End: 2022-12-02 | Stop reason: SDUPTHER

## 2021-09-16 RX ORDER — ASPIRIN 81 MG/1
81 TABLET ORAL DAILY
Qty: 90 TABLET | Refills: 1 | Status: SHIPPED | OUTPATIENT
Start: 2021-09-16

## 2021-09-16 RX ORDER — CARVEDILOL 25 MG/1
25 TABLET ORAL 2 TIMES DAILY WITH MEALS
Qty: 180 TABLET | Refills: 3 | Status: SHIPPED | OUTPATIENT
Start: 2021-09-16 | End: 2022-12-02 | Stop reason: SDUPTHER

## 2021-09-16 RX ORDER — ATORVASTATIN CALCIUM 10 MG/1
10 TABLET, FILM COATED ORAL NIGHTLY
Qty: 90 TABLET | Refills: 3 | Status: SHIPPED | OUTPATIENT
Start: 2021-09-16 | End: 2022-12-02 | Stop reason: SDUPTHER

## 2021-09-16 NOTE — TELEPHONE ENCOUNTER
Caller: Marlo Merrill    Relationship: Self    Best call back number: [phone]    Medication needed:   Requested Prescriptions     Pending Prescriptions Disp Refills   • aspirin (aspirin) 81 MG EC tablet 90 tablet 1     Sig: Take 1 tablet by mouth Daily.   • carvedilol (COREG) 25 MG tablet 180 tablet 3     Sig: Take 1 tablet by mouth 2 (Two) Times a Day With Meals.   • furosemide (LASIX) 40 MG tablet 180 tablet 1     Sig: Take 1 tablet by mouth Daily.   • lisinopril (PRINIVIL,ZESTRIL) 40 MG tablet 90 tablet 1     Sig: Take 1 tablet by mouth Daily.   • atorvastatin (LIPITOR) 10 MG tablet 90 tablet 3     Sig: Take 1 tablet by mouth Every Night.       When do you need the refill by: TOMORROW    What additional details did the patient provide when requesting the medication:     Does the patient have less than a 3 day supply:  [x] Yes  [] No    What is the patient's preferred [facility] [address] [place] [address] AT [facility] & (MAYA [place]) - [phone] [facility] [phone] FX

## 2021-09-21 ENCOUNTER — OFFICE VISIT (OUTPATIENT)
Dept: CARDIOLOGY | Facility: CLINIC | Age: 51
End: 2021-09-21

## 2021-09-21 ENCOUNTER — TRANSCRIBE ORDERS (OUTPATIENT)
Dept: CARDIOLOGY | Facility: CLINIC | Age: 51
End: 2021-09-21

## 2021-09-21 VITALS
SYSTOLIC BLOOD PRESSURE: 150 MMHG | DIASTOLIC BLOOD PRESSURE: 88 MMHG | HEART RATE: 68 BPM | WEIGHT: 221.4 LBS | HEIGHT: 74 IN | BODY MASS INDEX: 28.42 KG/M2

## 2021-09-21 DIAGNOSIS — I20.0 UNSTABLE ANGINA (HCC): Primary | ICD-10-CM

## 2021-09-21 DIAGNOSIS — Z13.6 SCREENING FOR ISCHEMIC HEART DISEASE: ICD-10-CM

## 2021-09-21 DIAGNOSIS — Z01.810 PRE-OPERATIVE CARDIOVASCULAR EXAMINATION: Primary | ICD-10-CM

## 2021-09-21 PROCEDURE — 99214 OFFICE O/P EST MOD 30 MIN: CPT | Performed by: INTERNAL MEDICINE

## 2021-09-21 NOTE — PROGRESS NOTES
[facility]      Patient Name: Marlo Merrill  :1970  Age: 51 y.o.  Encounter Provider:  Zafar Julio Jr, MD      Chief Complaint:   Chief Complaint   Patient presents with   • Chest Pain         Chest Pain   Pertinent negatives include no abdominal pain, cough, dizziness, fever, near-syncope, orthopnea, palpitations, PND or syncope.     Marlo Merrill is a 51 y.o. male with past medical history of resistant hypertension who presented to the hospital in April with heart failure presentation.  He had known about his high blood pressure but was not regularly being treated for it as he had not been compliant with previous follow-up.  He had been working long distance  and was eating out often and in a high stress work environment.  An echocardiogram was performed showing EF of 45% with regional wall motion abnormalities.  Stress study was performed showing a reversible inferior wall perfusion defect with no evidence of ischemia.  His medical regimen was optimized in-house and he was discharged on optimal medical therapy.  He followed up with WON Cooper and was doing well at that time.  Medications were continued and he continues with home blood pressure monitoring.  Blood pressure today was 140/87 but he notes that at recent doctor's visit his blood pressure was 120/80.  He is very strict with his sodium monitoring keeping his daily intake to 2000 mg or less.  He has been very conscious about optimizing his nutritional choices and is increasing his activity.  He has no symptoms with activity at this time which is a vast improvement from his time of hospitalization.  He denies angina or heart failure.  No palpitations, dizziness or syncope.  He smokes a few cigarettes per week but is working on stopping.  He drinks socially but states it is a rare occasion.  Family history is reviewed is not pertinent to this clinic visit.    Over the last month he has had  "experiencing frequent chest pain.  Chest pain is related to exertional activity along with emotional stressors at work.  He was having discomfort 3 to 4 days/week and on 15 September symptoms were so concerning he came to the ER.  Fortunately he had no new EKG changes and cardiac biomarkers were within normal range.  Unfortunately he continues to smoke cigarettes.  Blood pressure is uncontrolled today in clinic.  He is not monitoring his home blood pressure frequently.    The following portions of the patient's history were reviewed and updated as appropriate: allergies, current medications, past family history, past medical history, past social history, past surgical history and problem list.      Review of Systems   Constitutional: Negative for chills and fever.   HENT: Negative for hoarse voice and sore throat.    Eyes: Negative for double vision and photophobia.   Cardiovascular: Positive for chest pain. Negative for leg swelling, near-syncope, orthopnea, palpitations, paroxysmal nocturnal dyspnea and syncope.   Respiratory: Negative for cough and wheezing.    Skin: Negative for poor wound healing and rash.   Musculoskeletal: Negative for arthritis and joint swelling.   Gastrointestinal: Negative for bloating, abdominal pain, hematemesis and hematochezia.   Neurological: Negative for dizziness and focal weakness.   Psychiatric/Behavioral: Negative for depression and suicidal ideas.     ROS was reviewed, updated and amended when necessary.    OBJECTIVE:   Vital Signs  Vitals:    09/21/21 1113   BP: 150/88   Pulse: 68     Estimated body mass index is 28.43 kg/m² as calculated from the following:    Height as of this encounter: 188 cm (74\").    Weight as of this encounter: 100 kg (221 lb 6.4 oz).    Physical Exam  Vitals reviewed.   Constitutional:       Appearance: He is well-developed.   HENT:      Head: Normocephalic and atraumatic.   Eyes:      Conjunctiva/sclera: Conjunctivae normal.      Pupils: Pupils are " equal, round, and reactive to light.   Neck:      Thyroid: No thyromegaly.      Vascular: No JVD.   Cardiovascular:      Heart sounds: No murmur heard.   No friction rub. No gallop.    Pulmonary:      Effort: No respiratory distress.   Chest:      Chest wall: No tenderness.   Abdominal:      General: Bowel sounds are normal. There is no distension.   Musculoskeletal:         General: No tenderness.   Skin:     Findings: No erythema or rash.   Neurological:      Mental Status: He is alert and oriented to person, place, and time.   Psychiatric:         Judgment: Judgment normal.         Procedures          ASSESSMENT:     Resistant hypertension  Hypertensive heart disease with heart failure  Dyslipidemia    PLAN OF CARE:     1.  Hypertensive heart disease with heart failure -at last visit blood pressure was well controlled and the patient was feeling better.  Current clinical story is very concerning for unstable angina.  Plan for cardiac catheterization.  If no coronary culprit for current symptoms we will plan to aggressively optimize afterload reduction.  2.  Tobacco abuse -counseled on need for cessation and abstinence from tobacco products.  3.  Dyslipidemia -continue statin    Return to clinic 3 months           Discharge Medications          Accurate as of September 21, 2021 11:13 AM. If you have any questions, ask your nurse or doctor.            Changes to Medications      Instructions Start Date   carvedilol 25 MG tablet  Commonly known as: COREG  What changed: additional instructions   25 mg, Oral, 2 Times Daily With Meals         Continue These Medications      Instructions Start Date   amLODIPine 5 MG tablet  Commonly known as: NORVASC   5 mg, Oral, Daily      aspirin 81 MG EC tablet   81 mg, Oral, Daily      atorvastatin 10 MG tablet  Commonly known as: LIPITOR   10 mg, Oral, Nightly      eplerenone 50 MG tablet  Commonly known as: INSPRA   50 mg, Oral, Daily      furosemide 40 MG tablet  Commonly known  as: LASIX   40 mg, Oral, Daily      lisinopril 40 MG tablet  Commonly known as: PRINIVIL,ZESTRIL   40 mg, Oral, Daily             Thank you for allowing me to participate in the care of your patient,      Sincerely,   Zafar Julio MD  [facility]  09/21/21  11:13 EDT

## 2021-09-22 ENCOUNTER — TRANSCRIBE ORDERS (OUTPATIENT)
Dept: SLEEP MEDICINE | Facility: HOSPITAL | Age: 51
End: 2021-09-22

## 2021-09-22 DIAGNOSIS — Z01.818 OTHER SPECIFIED PRE-OPERATIVE EXAMINATION: Primary | ICD-10-CM

## 2021-10-15 NOTE — TELEPHONE ENCOUNTER
Error.    Doxepin Pregnancy And Lactation Text: This medication is Pregnancy Category C and it isn't known if it is safe during pregnancy. It is also excreted in breast milk and breast feeding isn't recommended.

## 2022-01-06 ENCOUNTER — TELEPHONE (OUTPATIENT)
Dept: CARDIOLOGY | Facility: CLINIC | Age: 52
End: 2022-01-06

## 2022-01-06 NOTE — TELEPHONE ENCOUNTER
Spoke to patient regarding heart cath cancelled.  Patient said he was changing jobs, and insurance at that time so decided to cancel.  Patient will come in the office to see you 1/27 then will discuss reordering heart cath at that time.    Thank you    Fer DOW

## 2022-08-22 ENCOUNTER — CLINICAL SUPPORT (OUTPATIENT)
Dept: FAMILY MEDICINE CLINIC | Facility: CLINIC | Age: 52
End: 2022-08-22

## 2022-08-22 VITALS — DIASTOLIC BLOOD PRESSURE: 98 MMHG | OXYGEN SATURATION: 99 % | HEART RATE: 69 BPM | SYSTOLIC BLOOD PRESSURE: 170 MMHG

## 2022-08-22 DIAGNOSIS — Z01.30 BLOOD PRESSURE CHECK: Primary | ICD-10-CM

## 2022-08-22 RX ORDER — AMLODIPINE BESYLATE 5 MG/1
5 TABLET ORAL DAILY
Qty: 30 TABLET | Refills: 2 | Status: SHIPPED | OUTPATIENT
Start: 2022-08-22 | End: 2022-12-02 | Stop reason: SDUPTHER

## 2022-12-02 ENCOUNTER — OFFICE VISIT (OUTPATIENT)
Dept: FAMILY MEDICINE CLINIC | Facility: CLINIC | Age: 52
End: 2022-12-02

## 2022-12-02 VITALS
TEMPERATURE: 98 F | HEIGHT: 74 IN | OXYGEN SATURATION: 97 % | WEIGHT: 213.4 LBS | HEART RATE: 77 BPM | SYSTOLIC BLOOD PRESSURE: 160 MMHG | RESPIRATION RATE: 18 BRPM | BODY MASS INDEX: 27.39 KG/M2 | DIASTOLIC BLOOD PRESSURE: 90 MMHG

## 2022-12-02 DIAGNOSIS — Z59.89 DOES NOT HAVE HEALTH INSURANCE: ICD-10-CM

## 2022-12-02 DIAGNOSIS — I10 RESISTANT HYPERTENSION: Primary | ICD-10-CM

## 2022-12-02 DIAGNOSIS — E78.5 HYPERLIPIDEMIA, UNSPECIFIED HYPERLIPIDEMIA TYPE: ICD-10-CM

## 2022-12-02 PROCEDURE — 99214 OFFICE O/P EST MOD 30 MIN: CPT

## 2022-12-02 RX ORDER — LISINOPRIL 40 MG/1
40 TABLET ORAL DAILY
Qty: 30 TABLET | Refills: 0 | Status: SHIPPED | OUTPATIENT
Start: 2022-12-02 | End: 2023-01-09 | Stop reason: SDUPTHER

## 2022-12-02 RX ORDER — FUROSEMIDE 40 MG/1
40 TABLET ORAL DAILY
Qty: 30 TABLET | Refills: 0 | Status: SHIPPED | OUTPATIENT
Start: 2022-12-02

## 2022-12-02 RX ORDER — EPLERENONE 50 MG/1
50 TABLET, FILM COATED ORAL DAILY
Qty: 30 TABLET | Refills: 0 | Status: SHIPPED | OUTPATIENT
Start: 2022-12-02 | End: 2023-01-09 | Stop reason: SDUPTHER

## 2022-12-02 RX ORDER — CARVEDILOL 25 MG/1
25 TABLET ORAL 2 TIMES DAILY WITH MEALS
Qty: 60 TABLET | Refills: 0 | Status: SHIPPED | OUTPATIENT
Start: 2022-12-02 | End: 2023-02-02 | Stop reason: SDUPTHER

## 2022-12-02 RX ORDER — LISINOPRIL 40 MG/1
40 TABLET ORAL DAILY
Qty: 90 TABLET | Refills: 1 | Status: SHIPPED | OUTPATIENT
Start: 2022-12-02 | End: 2022-12-02 | Stop reason: SDUPTHER

## 2022-12-02 RX ORDER — AMLODIPINE BESYLATE 5 MG/1
5 TABLET ORAL DAILY
Qty: 90 TABLET | Refills: 1 | Status: SHIPPED | OUTPATIENT
Start: 2022-12-02 | End: 2022-12-02 | Stop reason: SDUPTHER

## 2022-12-02 RX ORDER — CARVEDILOL 25 MG/1
25 TABLET ORAL 2 TIMES DAILY WITH MEALS
Qty: 180 TABLET | Refills: 1 | Status: SHIPPED | OUTPATIENT
Start: 2022-12-02 | End: 2022-12-02 | Stop reason: SDUPTHER

## 2022-12-02 RX ORDER — AMLODIPINE BESYLATE 5 MG/1
5 TABLET ORAL DAILY
Qty: 30 TABLET | Refills: 0 | Status: SHIPPED | OUTPATIENT
Start: 2022-12-02 | End: 2023-01-11 | Stop reason: SDUPTHER

## 2022-12-02 RX ORDER — ATORVASTATIN CALCIUM 10 MG/1
10 TABLET, FILM COATED ORAL NIGHTLY
Qty: 30 TABLET | Refills: 0 | Status: SHIPPED | OUTPATIENT
Start: 2022-12-02

## 2022-12-02 SDOH — ECONOMIC STABILITY - INCOME SECURITY: OTHER PROBLEMS RELATED TO HOUSING AND ECONOMIC CIRCUMSTANCES: Z59.89

## 2022-12-02 NOTE — PROGRESS NOTES
"Chief Complaint  Hypertension (Blood pressure concerns - A few days ago BP was 172/94.)    Subjective        Marlo Merrill presents to [facility]  History of Present Illness  Patient is a 52-year-old male, patient of Dr. Dunne last seen in office 2/17/2021 by Kaya Park, patient is new to me.  With hypertension, patient takes amlodipine 5 mg tablet daily, carvedilol 25 mg tablet 2 times daily, eplerenone 50mg tab daily, Lasix 40 mg tablet daily, and lisinopril 40 mg tablet daily.  Today's blood pressure in office 160/90.  Patient last saw Dr. Julio in office on 9/21/2021 for unstable angina and patient was advised to have cardiac cath and follow-up in clinic in 3 months.  Patient reports he lost his job and he was not able to follow-up with the cardiologist.  Patient states he does not have all of the prescribed medications for blood pressure and is out of his atorvastatin 10 mg tablet daily for hyperlipidemia.  Patient is self-pay today and unable to afford labs at today's visit.  Last labs completed 9/15/2021.  Patient okay with being referred to case management to set up health insurance.    Objective   Vital Signs:  /90 (BP Location: Left arm, Patient Position: Sitting)   Pulse 77   Temp 98 °F (36.7 °C) (Infrared)   Resp 18   Ht 188 cm (74.02\")   Wt 96.8 kg (213 lb 6.4 oz)   SpO2 97%   BMI 27.39 kg/m²   Estimated body mass index is 27.39 kg/m² as calculated from the following:    Height as of this encounter: 188 cm (74.02\").    Weight as of this encounter: 96.8 kg (213 lb 6.4 oz).          Physical Exam  Constitutional:       General: He is awake.      Appearance: Normal appearance.   HENT:      Head: Normocephalic and atraumatic.      Nose: Nose normal.   Eyes:      Extraocular Movements: Extraocular movements intact.      Conjunctiva/sclera: Conjunctivae normal.      Pupils: Pupils are equal, round, and reactive to light.   Cardiovascular:      Rate and " Rhythm: Normal rate and regular rhythm.      Pulses: Normal pulses.      Heart sounds: Normal heart sounds.   Pulmonary:      Effort: Pulmonary effort is normal.      Breath sounds: Normal breath sounds and air entry.   Skin:     General: Skin is warm and dry.   Neurological:      General: No focal deficit present.      Mental Status: He is alert and oriented to person, place, and time. Mental status is at baseline.   Psychiatric:         Attention and Perception: Attention normal.         Behavior: Behavior normal. Behavior is cooperative.        Result Review :                Assessment and Plan   Diagnoses and all orders for this visit:    1. Resistant hypertension (Primary)  -     Cancel: Ambulatory Referral to Social Care Services (Amb Case Mgmt)  -     Ambulatory Referral to Social Care Services (Amb Case Mgmt)    2. Hyperlipidemia, unspecified hyperlipidemia type    3. Does not have health insurance    Other orders  -     atorvastatin (LIPITOR) 10 MG tablet; Take 1 tablet by mouth Every Night.  Dispense: 30 tablet; Refill: 0  -     amLODIPine (NORVASC) 5 MG tablet; Take 1 tablet by mouth Daily.  Dispense: 30 tablet; Refill: 0  -     carvedilol (COREG) 25 MG tablet; Take 1 tablet by mouth 2 (Two) Times a Day With Meals. Pt reports taking one tablet daily  Dispense: 60 tablet; Refill: 0  -     eplerenone (INSPRA) 50 MG tablet; Take 1 tablet by mouth Daily.  Dispense: 30 tablet; Refill: 0  -     furosemide (LASIX) 40 MG tablet; Take 1 tablet by mouth Daily.  Dispense: 30 tablet; Refill: 0  -     lisinopril (PRINIVIL,ZESTRIL) 40 MG tablet; Take 1 tablet by mouth Daily.  Dispense: 30 tablet; Refill: 0    You have been given a 30-day supply on all of your medications today.  Please use [employer] coupon to fill prescriptions.     I have referred you to case management today to set up health insurance.  As soon as a set up, please call our office and we will schedule you for labs and refer you back to  cardiology.    Patient agrees with plan of care and understands instructions. Call if worsening symptoms or any problems or concerns.            Follow Up   Return in about 1 month (around 1/2/2023) for Fasting labs.  Patient was given instructions and counseling regarding his condition or for health maintenance advice. Please see specific information pulled into the AVS if appropriate.

## 2022-12-02 NOTE — PATIENT INSTRUCTIONS
You have been given a 30-day supply on all of your medications today.  Please use [employer] coupon to fill prescriptions.     I have referred you to case management today to set up health insurance.  As soon as a set up, please call our office and we will schedule you for labs and refer you back to cardiology.    Patient agrees with plan of care and understands instructions. Call if worsening symptoms or any problems or concerns.

## 2022-12-02 NOTE — TELEPHONE ENCOUNTER
PATIENT CALLED FOR MEDICATION REFILL OF   carvedilol (COREG) 25 MG tablet  HE HAS TWO TABLET LEFT    lisinopril (PRINIVIL,ZESTRIL) 40 MG tablet    amLODIPine (NORVASC) 5 MG tablet  HE IS OUT OF MEDICATION    [facility] [id number] [facility] [address] AT [facility] & ([address] - [phone]  - [phone] FX  [phone]    HIS NEXT VISIT IS 12/5/22    CALL BACK NUMBER [phone]

## 2022-12-05 ENCOUNTER — REFERRAL TRIAGE (OUTPATIENT)
Dept: CASE MANAGEMENT | Facility: OTHER | Age: 52
End: 2022-12-05

## 2022-12-05 ENCOUNTER — PATIENT OUTREACH (OUTPATIENT)
Dept: CASE MANAGEMENT | Facility: OTHER | Age: 52
End: 2022-12-05

## 2022-12-05 NOTE — OUTREACH NOTE
Social Work Assessment  Questions/Answers    Flowsheet Row Most Recent Value   Referral Source physician   Reason for Consult community resources, insurance concerns   Preferred Language English   People in Home spouse   Current Living Arrangements home   Primary Care Provided by self   Provides Primary Care For no one   Employment Status employed full-time   Source of Income salary/wages   Financial/Environmental Concerns insurance, none   Usual Activity Tolerance moderate   Current Activity Tolerance moderate   Medications independent   Meal Preparation independent   Housekeeping independent   Laundry independent   Shopping independent   Major Change/Loss/Stressor financial        SDOH updated and reviewed with the patient during this program:  Financial Resource Strain: High Risk   • Difficulty of Paying Living Expenses: Hard      Food Insecurity: No Food Insecurity   • Worried About Running Out of Food in the Last Year: Never true   • Ran Out of Food in the Last Year: Never true      Transportation Needs: No Transportation Needs   • Lack of Transportation (Medical): No   • Lack of Transportation (Non-Medical): No      Housing Stability: Low Risk    • Unable to Pay for Housing in the Last Year: No   • Number of Places Lived in the Last Year: 1   • Unstable Housing in the Last Year: No      Continuing Care   [facility]    [address] [place] [address]    Phone: [phone]    Resource for: Financial Resource Strain        Patient Outreach    MSW outreach to patient to discuss community resources available for patient. MSW contacted patient to discuss options for health insurance benefits. Patient states he lost his health insurance benefits with previous employer and has not enrolled in health benefits with new employer. Patient states that he has contacted his new employer regarding health insurance benefits on Friday and will follow-up regarding health insurance  benefits.    MSW reviewed option of enrollment in Medicaid or Qualified Health Plan through the Department for Community Based Services ([facility]). MSW provided contact information for patient to call and discuss with [employer] to get enrolled in benefits as soon as possible. Patient's health insurance benefits will be utilized for his medications to be provided at a lower cost. Patient did not need additional information on advance care planning. Patient did not have concerns about transportation, housing, or food benefits at this time. Patient will call back to MSW with additional needs.     LOREE FOOTE -   Ambulatory Case Management    12/5/2022, 10:49 EST

## 2023-01-09 RX ORDER — EPLERENONE 50 MG/1
50 TABLET, FILM COATED ORAL DAILY
Qty: 90 TABLET | Refills: 1 | Status: SHIPPED | OUTPATIENT
Start: 2023-01-09 | End: 2023-02-17 | Stop reason: SDUPTHER

## 2023-01-09 RX ORDER — LISINOPRIL 40 MG/1
40 TABLET ORAL DAILY
Qty: 90 TABLET | Refills: 1 | Status: SHIPPED | OUTPATIENT
Start: 2023-01-09

## 2023-01-09 NOTE — TELEPHONE ENCOUNTER
Caller: Marlo Merrill    Relationship: Self    Best call back [phone]:  Requested Prescriptions:   Requested Prescriptions     Pending Prescriptions Disp Refills   • lisinopril (PRINIVIL,ZESTRIL) 40 MG tablet 30 tablet 0     Sig: Take 1 tablet by mouth Daily.   • eplerenone (INSPRA) 50 MG tablet 30 tablet 0     Sig: Take 1 tablet by mouth Daily.        Pharmacy where request should be sent: [facility] [id number] [place] [address] [address] AT [facility] & ([address]  [phone] [facility] [phone] FX     Additional details provided by patient: PATIENT HAS A 1 DAY SUPPLY LEFT OF MEDICATION    PATIENT HAS AN APPOINTMENT ON 1/16/23. HE WOULD LIKE TO KNOW IF HE CAN GET A REFILL TO LAST UNTIL HIS APPOINTMENT     Does the patient have less than a 3 day supply:  [x] Yes  [] No    Would you like a call back once the refill request has been completed: [x] Yes [] No    If the office needs to give you a call back, can they leave a voicemail: [x] Yes [] No    Alison Pride Rep   01/09/23 13:30 EST

## 2023-01-11 RX ORDER — AMLODIPINE BESYLATE 5 MG/1
5 TABLET ORAL DAILY
Qty: 90 TABLET | Refills: 1 | Status: SHIPPED | OUTPATIENT
Start: 2023-01-11

## 2023-01-11 NOTE — TELEPHONE ENCOUNTER
Incoming Refill Request      Medication requested (name and dose):   amLODIPine (NORVASC) 5 MG tablet  5 mg, Daily 0 ordered         Pharmacy where request should be sent: [facility] [id number] - [facility] [address] AT [facility] [address] [facility] - [phone]  - [phone]   [phone]    Additional details provided by patient: NONE    Best call back number: [phone]    Does the patient have less than a 3 day supply:  [x] Yes  [] No    Alison Jaime Rep  01/11/23, 15:31 EST

## 2023-02-02 NOTE — TELEPHONE ENCOUNTER
Caller: Marlo Merrill    Relationship: Self    Best call back number: [phone]    Requested Prescriptions:   Requested Prescriptions     Pending Prescriptions Disp Refills   • carvedilol (COREG) 25 MG tablet 60 tablet 0     Sig: Take 1 tablet by mouth 2 (Two) Times a Day With Meals. Pt reports taking one tablet daily        Pharmacy where request should be sent: [facility] [id number] [place] [address] [address] AT [facility] ([address] [facility] [phone] [facility] [phone] FX       Does the patient have less than a 3 day supply:  [x] Yes  [] No    Would you like a call back once the refill request has been completed: [] Yes [x] No    If the office needs to give you a call back, can they leave a voicemail: [] Yes [x] No    [facility], Alison Rep   02/02/23 15:15 EST

## 2023-02-03 RX ORDER — CARVEDILOL 25 MG/1
25 TABLET ORAL 2 TIMES DAILY WITH MEALS
Qty: 60 TABLET | Refills: 0 | Status: SHIPPED | OUTPATIENT
Start: 2023-02-03 | End: 2023-04-05

## 2023-02-17 RX ORDER — EPLERENONE 50 MG/1
50 TABLET, FILM COATED ORAL DAILY
Qty: 90 TABLET | Refills: 1 | Status: SHIPPED | OUTPATIENT
Start: 2023-02-17

## 2023-02-17 NOTE — TELEPHONE ENCOUNTER
Caller: Marlo Merrill    Relationship: Self    Best call back number: [phone]    Requested Prescriptions:   Requested Prescriptions     Pending Prescriptions Disp Refills   • eplerenone (INSPRA) 50 MG tablet 90 tablet 1     Sig: Take 1 tablet by mouth Daily.        Pharmacy where request should be sent: [facility] [id number] [facility] [address] AT [facility] [address] [facility] [phone] [facility] [phone] FX     Does the patient have less than a 3 day supply:  [x] Yes  [] No    Would you like a call back once the refill request has been completed: [] Yes [x] No    If the office needs to give you a call back, can they leave a voicemail: [] Yes [x] No    Alison Sanford Rep   02/17/23 08:14 EST

## 2023-04-05 RX ORDER — CARVEDILOL 25 MG/1
TABLET ORAL
Qty: 60 TABLET | Refills: 4 | Status: SHIPPED | OUTPATIENT
Start: 2023-04-05

## 2023-05-04 ENCOUNTER — TELEPHONE (OUTPATIENT)
Dept: FAMILY MEDICINE CLINIC | Facility: CLINIC | Age: 53
End: 2023-05-04
Payer: COMMERCIAL

## 2023-05-04 ENCOUNTER — CLINICAL SUPPORT (OUTPATIENT)
Dept: FAMILY MEDICINE CLINIC | Facility: CLINIC | Age: 53
End: 2023-05-04
Payer: COMMERCIAL

## 2023-05-04 VITALS — DIASTOLIC BLOOD PRESSURE: 102 MMHG | SYSTOLIC BLOOD PRESSURE: 174 MMHG

## 2023-05-04 DIAGNOSIS — Z01.30 BLOOD PRESSURE CHECK: Primary | ICD-10-CM

## 2023-05-04 NOTE — TELEPHONE ENCOUNTER
"Patient walk in this morning for BP check. Feeling \"weird.\"    Two readings:    192/104  174/102 after 15 min    Dr. Boyce advised to go to ER. Patient informed and agreeable. Would like May to look at numbers and possibly make a medication change.     Took Carvedilol and Lisinopril this morning.     "

## 2023-05-08 NOTE — TELEPHONE ENCOUNTER
Patient given an appt. Can someone check make sure did right he needed an appt. Due to b/p elevation work in but also change pcp

## 2023-09-02 NOTE — PROGRESS NOTES
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 Patient vitals have been recorded.  Patient has been out of  Amlodipine awhile, per Julianne UPTON restart Amlodipine and follow up here in 1 week.       190052: || ||00\01||False;

## 2024-03-15 ENCOUNTER — APPOINTMENT (OUTPATIENT)
Dept: GENERAL RADIOLOGY | Facility: HOSPITAL | Age: 54
End: 2024-03-15
Payer: COMMERCIAL

## 2024-03-15 ENCOUNTER — HOSPITAL ENCOUNTER (EMERGENCY)
Facility: HOSPITAL | Age: 54
Discharge: HOME | End: 2024-03-15
Attending: EMERGENCY MEDICINE
Payer: COMMERCIAL

## 2024-03-15 VITALS
TEMPERATURE: 98 F | RESPIRATION RATE: 15 BRPM | HEART RATE: 65 BPM | SYSTOLIC BLOOD PRESSURE: 194 MMHG | OXYGEN SATURATION: 98 % | DIASTOLIC BLOOD PRESSURE: 118 MMHG

## 2024-03-15 DIAGNOSIS — M54.6 ACUTE LEFT-SIDED THORACIC BACK PAIN: ICD-10-CM

## 2024-03-15 DIAGNOSIS — I10 PRIMARY HYPERTENSION: Primary | ICD-10-CM

## 2024-03-15 LAB
ALBUMIN SERPL-MCNC: 4.6 G/DL (ref 3.5–5.2)
ALBUMIN/GLOB SERPL: 1.6 G/DL
ALP SERPL-CCNC: 80 U/L (ref 39–117)
ALT SERPL W P-5'-P-CCNC: 26 U/L (ref 1–41)
ANION GAP SERPL CALCULATED.3IONS-SCNC: 10 MMOL/L (ref 5–15)
AST SERPL-CCNC: 18 U/L (ref 1–40)
BASOPHILS # BLD MANUAL: 0.14 10*3/MM3 (ref 0–0.2)
BASOPHILS NFR BLD MANUAL: 2 % (ref 0–1.5)
BILIRUB SERPL-MCNC: 0.4 MG/DL (ref 0–1.2)
BUN SERPL-MCNC: 12 MG/DL (ref 6–20)
BUN/CREAT SERPL: 10.3 (ref 7–25)
CALCIUM SPEC-SCNC: 9.1 MG/DL (ref 8.6–10.5)
CHLORIDE SERPL-SCNC: 108 MMOL/L (ref 98–107)
CO2 SERPL-SCNC: 24 MMOL/L (ref 22–29)
CREAT SERPL-MCNC: 1.16 MG/DL (ref 0.76–1.27)
D DIMER PPP FEU-MCNC: <0.27 MCGFEU/ML (ref 0–0.54)
DEPRECATED RDW RBC AUTO: 41.7 FL (ref 37–54)
EGFRCR SERPLBLD CKD-EPI 2021: 74.8 ML/MIN/1.73
ERYTHROCYTE [DISTWIDTH] IN BLOOD BY AUTOMATED COUNT: 13.7 % (ref 12.3–15.4)
GLOBULIN UR ELPH-MCNC: 2.9 GM/DL
GLUCOSE SERPL-MCNC: 119 MG/DL (ref 65–99)
HCT VFR BLD AUTO: 45.9 % (ref 37.5–51)
HGB BLD-MCNC: 15.2 G/DL (ref 13–17.7)
LYMPHOCYTES # BLD MANUAL: 3.26 10*3/MM3 (ref 0.7–3.1)
LYMPHOCYTES NFR BLD MANUAL: 9.1 % (ref 5–12)
MCH RBC QN AUTO: 28 PG (ref 26.6–33)
MCHC RBC AUTO-ENTMCNC: 33.1 G/DL (ref 31.5–35.7)
MCV RBC AUTO: 84.5 FL (ref 79–97)
MONOCYTES # BLD: 0.65 10*3/MM3 (ref 0.1–0.9)
NEUTROPHILS # BLD AUTO: 3.11 10*3/MM3 (ref 1.7–7)
NEUTROPHILS NFR BLD MANUAL: 43.4 % (ref 42.7–76)
NRBC BLD AUTO-RTO: 0 /100 WBC (ref 0–0.2)
NT-PROBNP SERPL-MCNC: 884 PG/ML (ref 0–900)
PLAT MORPH BLD: NORMAL
PLATELET # BLD AUTO: 324 10*3/MM3 (ref 140–450)
PMV BLD AUTO: 11.4 FL (ref 6–12)
POTASSIUM SERPL-SCNC: 3.7 MMOL/L (ref 3.5–5.2)
PROT SERPL-MCNC: 7.5 G/DL (ref 6–8.5)
QT INTERVAL: 428 MS
QTC INTERVAL: 456 MS
RBC # BLD AUTO: 5.43 10*6/MM3 (ref 4.14–5.8)
RBC MORPH BLD: NORMAL
SODIUM SERPL-SCNC: 142 MMOL/L (ref 136–145)
TROPONIN T SERPL HS-MCNC: 19 NG/L
VARIANT LYMPHS NFR BLD MANUAL: 45.5 % (ref 19.6–45.3)
WBC MORPH BLD: NORMAL
WBC NRBC COR # BLD AUTO: 7.16 10*3/MM3 (ref 3.4–10.8)

## 2024-03-15 PROCEDURE — 93005 ELECTROCARDIOGRAM TRACING: CPT | Performed by: EMERGENCY MEDICINE

## 2024-03-15 PROCEDURE — 83880 ASSAY OF NATRIURETIC PEPTIDE: CPT | Performed by: EMERGENCY MEDICINE

## 2024-03-15 PROCEDURE — 85007 BL SMEAR W/DIFF WBC COUNT: CPT | Performed by: EMERGENCY MEDICINE

## 2024-03-15 PROCEDURE — 85025 COMPLETE CBC W/AUTO DIFF WBC: CPT | Performed by: EMERGENCY MEDICINE

## 2024-03-15 PROCEDURE — 84484 ASSAY OF TROPONIN QUANT: CPT | Performed by: EMERGENCY MEDICINE

## 2024-03-15 PROCEDURE — 71045 X-RAY EXAM CHEST 1 VIEW: CPT

## 2024-03-15 PROCEDURE — 80053 COMPREHEN METABOLIC PANEL: CPT | Performed by: EMERGENCY MEDICINE

## 2024-03-15 PROCEDURE — 85379 FIBRIN DEGRADATION QUANT: CPT | Performed by: EMERGENCY MEDICINE

## 2024-03-15 PROCEDURE — 93010 ELECTROCARDIOGRAM REPORT: CPT | Performed by: INTERNAL MEDICINE

## 2024-03-15 PROCEDURE — 99284 EMERGENCY DEPT VISIT MOD MDM: CPT

## 2024-03-15 RX ORDER — SODIUM CHLORIDE 0.9 % (FLUSH) 0.9 %
10 SYRINGE (ML) INJECTION AS NEEDED
Status: DISCONTINUED | OUTPATIENT
Start: 2024-03-15 | End: 2024-03-15 | Stop reason: HOSPADM

## 2024-03-15 RX ORDER — ACETAMINOPHEN 500 MG
1000 TABLET ORAL ONCE
Status: COMPLETED | OUTPATIENT
Start: 2024-03-15 | End: 2024-03-15

## 2024-03-15 RX ORDER — FUROSEMIDE 40 MG/1
40 TABLET ORAL DAILY
Qty: 30 TABLET | Refills: 0 | Status: SHIPPED | OUTPATIENT
Start: 2024-03-15 | End: 2024-04-14

## 2024-03-15 RX ORDER — LISINOPRIL 40 MG/1
40 TABLET ORAL DAILY
Qty: 30 TABLET | Refills: 0 | Status: SHIPPED | OUTPATIENT
Start: 2024-03-15 | End: 2024-04-14

## 2024-03-15 RX ORDER — CARVEDILOL 25 MG/1
25 TABLET ORAL 2 TIMES DAILY WITH MEALS
Qty: 60 TABLET | Refills: 0 | Status: SHIPPED | OUTPATIENT
Start: 2024-03-15 | End: 2024-04-14

## 2024-03-15 RX ORDER — AMLODIPINE BESYLATE 5 MG/1
5 TABLET ORAL DAILY
Qty: 30 TABLET | Refills: 0 | Status: SHIPPED | OUTPATIENT
Start: 2024-03-15 | End: 2024-04-14

## 2024-03-15 RX ADMIN — ACETAMINOPHEN 1000 MG: 500 TABLET ORAL at 14:41

## 2024-03-15 NOTE — ED PROVIDER NOTES
EMERGENCY DEPARTMENT ENCOUNTER    Room Number:  HD2/H  PCP: Javier Dunne MD    HPI:  Chief Complaint: Left thoracic pain  A complete HPI/ROS/PMH/PSH/SH/FH are unobtainable due to: None  Context: Marlo Merrill is a 53 y.o. male who presents to the ED c/o acute left thoracic pain around the scapular region.  He has noticed this for the last 2 days.  It occurs only when takes a big breath.  He states that he is a  and was trying to sit in comfortable positions but it was worse when he was bouncing around.  No fever, precordial chest pain, shortness of breath, abdominal pain.        PAST MEDICAL HISTORY  Active Ambulatory Problems     Diagnosis Date Noted    Hypertension 02/15/2017    Orthopnea 06/05/2020    Acute combined systolic and diastolic congestive heart failure 06/05/2020    Leg swelling 06/05/2020    Lymph nodes enlarged 06/05/2020    Hypertensive heart disease with acute systolic congestive heart failure 06/07/2020    Unstable angina 09/21/2021    Hyperlipidemia 12/02/2022     Resolved Ambulatory Problems     Diagnosis Date Noted    Obesity (BMI 30-39.9) 06/07/2020     Past Medical History:   Diagnosis Date    Acute combined systolic and diastolic heart failure          PAST SURGICAL HISTORY  No past surgical history on file.      FAMILY HISTORY  Family History   Problem Relation Age of Onset    Hypertension Mother     Diabetes Mother     Heart disease Father         stent    No Known Problems Sister     No Known Problems Brother     No Known Problems Maternal Grandmother     No Known Problems Maternal Grandfather     No Known Problems Paternal Grandmother     No Known Problems Paternal Grandfather          SOCIAL HISTORY  Social History     Socioeconomic History    Marital status:    Tobacco Use    Smoking status: Every Day     Current packs/day: 1.00     Average packs/day: 1 pack/day for 35.0 years (35.0 ttl pk-yrs)     Types: Cigarettes    Smokeless tobacco: Never   Substance and  "Sexual Activity    Alcohol use: Yes     Comment: \"occ beer\";  caffeine use- coffee    Drug use: No         ALLERGIES  Patient has no known allergies.        REVIEW OF SYSTEMS  Review of Systems     Included in HPI  All systems reviewed and negative except for those discussed in HPI.       PHYSICAL EXAM  ED Triage Vitals   Temp Heart Rate Resp BP SpO2   03/15/24 1347 03/15/24 1347 03/15/24 1347 03/15/24 1407 03/15/24 1347   98 °F (36.7 °C) 80 16 (!) 198/116 98 %      Temp src Heart Rate Source Patient Position BP Location FiO2 (%)   03/15/24 1347 03/15/24 1347 -- -- --   Tympanic Monitor          Physical Exam      GENERAL: no acute distress  HENT: nares patent  EYES: no scleral icterus  CV: regular rhythm, normal rate  RESPIRATORY: normal effort, clear to auscultation bilaterally  ABDOMEN: soft, nontender  MUSCULOSKELETAL: no deformity, no reproducible tenderness to the back, no midline spinal tenderness  NEURO: alert, moves all extremities, follows commands  PSYCH:  calm, cooperative  SKIN: warm, dry    Vital signs and nursing notes reviewed.          LAB RESULTS  No results found for this or any previous visit (from the past 24 hour(s)).    Ordered the above labs and reviewed the results.        RADIOLOGY  No Radiology Exams Resulted Within Past 24 Hours    Ordered the above noted radiological studies. Reviewed by me in PACS.        MEDICATIONS GIVEN IN ER  Medications   sodium chloride 0.9 % flush 10 mL (has no administration in time range)         ORDERS PLACED DURING THIS VISIT:  Orders Placed This Encounter   Procedures    XR Chest 1 View    Comprehensive Metabolic Panel    BNP    Single High Sensitivity Troponin T    D-dimer, Quantitative    CBC Auto Differential    Monitor Blood Pressure    Pulse Oximetry, Continuous    ECG 12 Lead Chest Pain    Insert Peripheral IV    CBC & Differential         OUTPATIENT MEDICATION MANAGEMENT:  Current Facility-Administered Medications Ordered in Epic   Medication Dose " Route Frequency Provider Last Rate Last Admin    sodium chloride 0.9 % flush 10 mL  10 mL Intravenous PRN Jerry De Leon II, MD         Current Outpatient Medications Ordered in Epic   Medication Sig Dispense Refill    amLODIPine (NORVASC) 5 MG tablet Take 1 tablet by mouth Daily. 90 tablet 1    aspirin (aspirin) 81 MG EC tablet Take 1 tablet by mouth Daily. 90 tablet 1    atorvastatin (LIPITOR) 10 MG tablet Take 1 tablet by mouth Every Night. 30 tablet 0    carvedilol (COREG) 25 MG tablet Take 1 tablet by mouth 2 (Two) Times a Day With Meals. 28 tablet 0    eplerenone (INSPRA) 50 MG tablet Take 1 tablet by mouth Daily. 90 tablet 1    furosemide (LASIX) 40 MG tablet Take 1 tablet by mouth Daily. 30 tablet 0    lisinopril (PRINIVIL,ZESTRIL) 40 MG tablet Take 1 tablet by mouth Daily. 90 tablet 1       PROCEDURES  Procedures          MEDICAL DECISION MAKING, PROGRESS, and CONSULTS    Discussion below represents my analysis of pertinent findings related to patient's condition, differential diagnosis, treatment plan and final disposition.      Additional sources:  - Discussed/obtained information from independent historians:   Wife at bedside   Additional information was obtained to confirm the patient's history.          Differential diagnosis:    Atypical presentation for ACS, pulmonary embolism, pneumothorax, zoster, musculoskeletal cause of his pain           After initial evaluation, I considered the need for admission due to the possibility of PE given his sharp pleuritic chest pain.  Therefore, D-dimer ordered.          Independent interpretation of labs, radiology studies, and discussions with consultants:  ED Course as of 03/15/24 1526   Fri Mar 15, 2024   1416 BP(!): 198/116 [TD]   1508 D-Dimer, Quant: <0.27 [TD]   1508 EKG independently interpreted by myself.  Left atrial abnormality..  Time 2:40 PM.  Sinus rhythm.  Heart rate 68 left axis deviation.  LAFB.  LVH.  Strain pattern. [TD]   1509 HS Troponin  T: 19 [TD]   1509 Chest x-ray independently interpreted by myself.  No pneumonia.  No pneumothorax. [TD]      ED Course User Index  [TD] Jerry De Leon II, MD         Pain is well-controlled for the patient at this time.  D-dimer is negative.  This is a very atypical pain for this to be ACS.  Given his EKG and troponin, I do not believe that repeat troponin is needed at this time.    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    Per AHA guidelines, we will initiate his hypertensive therapy again.  I have sent his prior prescriptions to the pharmacy in order for him to restart his medications.        DIAGNOSIS  Final diagnoses:   Primary hypertension   Acute left-sided thoracic back pain         DISPOSITION  DISCHARGE    FOLLOW-UP  Asa Rodriguez, APRN  [address] [facility] [address]  [facility] [id number] [phone]    Schedule an appointment as soon as possible for a visit in 1 week      [facility] EMERGENCY DEPARTMENT  [address] [facility] [address] [phone]  Go to   If symptoms worsen         Where to Get Your Medications        These medications were sent to [facility] [id number] - [place], [place] - [address] AT [facility] & ([address] ) - [phone] [facility] [phone] [address], [facility] [address]      Phone: [phone]   amLODIPine 5 MG tablet  carvedilol 25 MG tablet  furosemide 40 MG tablet  lisinopril 40 MG tablet          Medication List      No changes were made to your prescriptions during this visit.             Latest Documented Vital Signs:  As of 14:21 EDT  BP- (!) 198/116 HR- 80 Temp- 98 °F (36.7 °C) (Tympanic) O2 sat- 98%      --    Please note that portions of this were completed with a voice recognition program.       Note Disclaimer: At [facility], we believe that sharing information builds trust and better relationships. You are receiving this  note because you are receiving care at [facility] or recently visited. It is possible you will see health information before a provider has talked with you about it. This kind of information can be easy to misunderstand. To help you fully understand what it means for your health, we urge you to discuss this note with your provider.         Jerry De Leon II, MD  03/15/24 2738

## 2024-03-15 NOTE — ED NOTES
Sharp top of back pain on inspiration.  His bp at  was 199/137.  He is supposed to be on BP meds but states he hasn't taken them in 3 months.  He denies cp

## 2024-03-26 ENCOUNTER — OFFICE VISIT (OUTPATIENT)
Dept: FAMILY MEDICINE CLINIC | Facility: CLINIC | Age: 54
End: 2024-03-26
Payer: COMMERCIAL

## 2024-03-26 VITALS
OXYGEN SATURATION: 96 % | SYSTOLIC BLOOD PRESSURE: 178 MMHG | DIASTOLIC BLOOD PRESSURE: 100 MMHG | WEIGHT: 215 LBS | HEIGHT: 74 IN | HEART RATE: 70 BPM | BODY MASS INDEX: 27.59 KG/M2 | TEMPERATURE: 98 F

## 2024-03-26 DIAGNOSIS — Z76.89 ENCOUNTER TO ESTABLISH CARE: ICD-10-CM

## 2024-03-26 DIAGNOSIS — I10 PRIMARY HYPERTENSION: Primary | ICD-10-CM

## 2024-03-26 DIAGNOSIS — Z12.11 SCREEN FOR COLON CANCER: ICD-10-CM

## 2024-03-26 DIAGNOSIS — I16.0 HYPERTENSIVE URGENCY: ICD-10-CM

## 2024-03-26 RX ORDER — AMLODIPINE BESYLATE 5 MG/1
10 TABLET ORAL DAILY
Start: 2024-03-26 | End: 2024-04-25

## 2024-03-26 NOTE — PROGRESS NOTES
"Chief Complaint  Establish Care (Pt is here today to establish care, pt presents no chief complaints.)    Subjective        Marlo Merrill presents to [facility]    History of Present Illness  53 year old male presents to establish care. Pt is new to me, previously seen by WON Patel. Educated he may receive Covid, Flu, and Shingles vaccine at pharmacy. He has hypertension that is significantly elevated in clinic today at 178/100. Denies chest pain and shortness of breath. Confirms slight headache, Denies blurry vision, light headedness and dizziness. Confirms he has taken medication today. Repeat /96. Increase amlodipine 10mg day. Reports he has home blood pressure cuff. Log attached to AVS to bring back to appt next week. Denies he sees Cardiology, has never had holter monitor. Return in one week for recheck. Due for colon cancer screening.       Objective   Vital Signs:  /100   Pulse 70   Temp 98 °F (36.7 °C)   Ht 188 cm (74.02\")   Wt 97.5 kg (215 lb)   SpO2 96%   BMI 27.59 kg/m²   Estimated body mass index is 27.59 kg/m² as calculated from the following:    Height as of this encounter: 188 cm (74.02\").    Weight as of this encounter: 97.5 kg (215 lb).       BMI is >= 25 and <30. (Overweight) The following options were offered after discussion;: weight loss educational material (shared in after visit summary), exercise counseling/recommendations, and nutrition counseling/recommendations      Physical Exam  Constitutional:       Appearance: Normal appearance.   HENT:      Head: Normocephalic.   Eyes:      Conjunctiva/sclera: Conjunctivae normal.      Pupils: Pupils are equal, round, and reactive to light.   Cardiovascular:      Rate and Rhythm: Normal rate and regular rhythm.      Pulses: Normal pulses.   Pulmonary:      Effort: Pulmonary effort is normal.      Breath sounds: Normal breath sounds.   Musculoskeletal:         General: Normal range of motion. "   Skin:     General: Skin is warm.   Neurological:      General: No focal deficit present.      Mental Status: He is alert and oriented to person, place, and time.   Psychiatric:         Mood and Affect: Mood normal.         Behavior: Behavior normal.            Result Review :    The following data was reviewed by: WON Reed on 03/26/2024:  Common labs          3/15/2024    14:33   Common Labs   Glucose 119    BUN 12    Creatinine 1.16    Sodium 142    Potassium 3.7    Chloride 108    Calcium 9.1    Albumin 4.6    Total Bilirubin 0.4    Alkaline Phosphatase 80    AST (SGOT) 18    ALT (SGPT) 26    WBC 7.16    Hemoglobin 15.2    Hematocrit 45.9    Platelets 324      Data reviewed : Recent hospitalization notes [facility] ED 3/15 and labs, EKG             Assessment and Plan     Diagnoses and all orders for this visit:    1. Primary hypertension (Primary)  Assessment & Plan:  Hypertension is borderline  Medication changes per orders.  Dietary sodium restriction.  Weight loss.  Regular aerobic exercise.  Ambulatory blood pressure monitoring.  Blood pressure will be reassessed 1 week. .    Orders:  -     amLODIPine (NORVASC) 5 MG tablet; Take 2 tablets by mouth Daily for 30 days.    2. Hypertensive urgency  Comments:  Amlodipine dose increased  Go to ER for chest pain and shortness of breath  Go to ER for reading of 180/100 or higher  Limit sodium, red meats, and fried foods.    3. Screen for colon cancer  -     Cologuard - Stool, Per Rectum; Future    4. Encounter to establish care             Follow Up     Return in about 1 week (around 4/2/2024) for Recheck BP.  Patient was given instructions and counseling regarding his condition or for health maintenance advice. Please see specific information pulled into the AVS if appropriate.

## 2024-03-26 NOTE — ASSESSMENT & PLAN NOTE
Hypertension is borderline  Medication changes per orders.  Dietary sodium restriction.  Weight loss.  Regular aerobic exercise.  Ambulatory blood pressure monitoring.  Blood pressure will be reassessed 1 week. .

## 2024-04-16 DIAGNOSIS — I10 PRIMARY HYPERTENSION: ICD-10-CM

## 2024-04-16 RX ORDER — LISINOPRIL 40 MG/1
40 TABLET ORAL DAILY
Qty: 30 TABLET | Refills: 0 | Status: CANCELLED | OUTPATIENT
Start: 2024-04-16 | End: 2024-05-16

## 2024-04-16 RX ORDER — AMLODIPINE BESYLATE 5 MG/1
10 TABLET ORAL DAILY
Status: CANCELLED
Start: 2024-04-16 | End: 2024-05-16

## 2024-04-16 NOTE — TELEPHONE ENCOUNTER
Caller: ANGEL MONTALVO    Relationship: Emergency Contact    Requested Prescriptions:   Requested Prescriptions     Pending Prescriptions Disp Refills    amLODIPine (NORVASC) 5 MG tablet       Sig: Take 2 tablets by mouth Daily for 30 days.    lisinopril (PRINIVIL,ZESTRIL) 40 MG tablet 30 tablet 0     Sig: Take 1 tablet by mouth Daily for 30 days.        Pharmacy where request should be sent: [facility] [id number] [place] [id number] AT [facility])  [phone] [facility] [phone]      Last office visit with prescribing clinician: 3/26/2024   Last telemedicine visit with prescribing clinician: Visit date not found   Next office visit with prescribing clinician: Visit date not found     Additional details provided by patient: ANGEL STATES THAT THE PATIENT  HE DID COME IN FOR HIS LAST SCHEDULED APPOINTMENT, BUT STATES THAT WHEN HE CHECKED IN, HE WAS TURNED AWAY FROM HIS APPOINTMENT STATING HE WAS TOLD THAT BECAUSE HE DID NOT HAVE THE MONEY TO PAY HIS CO PAY, THE PCP WOULD NOT SEE THE PATIENT.     PATIENT ASKED THE  IF HE COULD PAY THE CO PAY LATER AS HE WAS NOT PREPARED AT THAT TIME TO MAKE A PAYMENT AND HE WAS TOLD NO.         PATIENT IS OUT OF HIS MEDICATIONS.        Does the patient have less than a 3 day supply:  [x] Yes  [] No    Would you like a call back once the refill request has been completed: [] Yes [x] No    If the office needs to give you a call back, can they leave a voicemail: [] Yes [x] No    Alison Avelar   04/16/24 09:01 EDT

## 2024-04-17 DIAGNOSIS — I10 PRIMARY HYPERTENSION: ICD-10-CM

## 2024-04-17 RX ORDER — AMLODIPINE BESYLATE 5 MG/1
10 TABLET ORAL DAILY
Qty: 60 TABLET | Refills: 2 | Status: SHIPPED | OUTPATIENT
Start: 2024-04-17 | End: 2024-04-19 | Stop reason: SDUPTHER

## 2024-04-17 RX ORDER — LISINOPRIL 40 MG/1
40 TABLET ORAL DAILY
Qty: 30 TABLET | Refills: 3 | Status: SHIPPED | OUTPATIENT
Start: 2024-04-17 | End: 2024-04-19 | Stop reason: SDUPTHER

## 2024-04-17 RX ORDER — LISINOPRIL 40 MG/1
40 TABLET ORAL DAILY
Qty: 30 TABLET | Refills: 0 | Status: SHIPPED | OUTPATIENT
Start: 2024-04-17 | End: 2024-04-17 | Stop reason: SDUPTHER

## 2024-04-17 RX ORDER — AMLODIPINE BESYLATE 5 MG/1
10 TABLET ORAL DAILY
Start: 2024-04-17 | End: 2024-04-17 | Stop reason: SDUPTHER

## 2024-04-17 NOTE — TELEPHONE ENCOUNTER
PATIENT CALLING BACK, STATED THE MEDICATIONS WERE SENT TO THE INCORRECT [facility].    PLEASE RESEND TO [facility] [id number] - [place], [place] - [address] AT [facility] & ([address])  [phone] [facility] [phone] FX  .      HE WOULD LIKE A CALL ONCE RESENT.

## 2024-04-19 DIAGNOSIS — I10 PRIMARY HYPERTENSION: ICD-10-CM

## 2024-04-19 RX ORDER — AMLODIPINE BESYLATE 5 MG/1
10 TABLET ORAL DAILY
Qty: 60 TABLET | Refills: 2 | Status: SHIPPED | OUTPATIENT
Start: 2024-04-19 | End: 2024-07-18

## 2024-04-19 RX ORDER — LISINOPRIL 40 MG/1
40 TABLET ORAL DAILY
Qty: 30 TABLET | Refills: 3 | Status: SHIPPED | OUTPATIENT
Start: 2024-04-19 | End: 2024-08-17

## 2024-04-19 NOTE — TELEPHONE ENCOUNTER
Caller: Marlo Merrill    Relationship: Self    Best call back number: [phone]   Requested Prescriptions:   Requested Prescriptions     Pending Prescriptions Disp Refills    lisinopril (PRINIVIL,ZESTRIL) 40 MG tablet 30 tablet 3     Sig: Take 1 tablet by mouth Daily for 120 days.    amLODIPine (NORVASC) 5 MG tablet 60 tablet 2     Sig: Take 2 tablets by mouth Daily for 90 days.        Pharmacy where request should be sent: [facility] [id number] [place] [id number] [address] AT [facility] & ([address] )  [phone] [facility] [phone] FX     Last office visit with prescribing clinician: 3/26/2024   Last telemedicine visit with prescribing clinician: Visit date not found   Next office visit with prescribing clinician: 4/22/2024     Additional details provided by patient: PHARMACY CANCELED PRESCRIPTIONS AND NEED NEW ONE SENT OVER PLEASE RESEND     Does the patient have less than a 3 day supply:  [x] Yes  [] No    Would you like a call back once the refill request has been completed: [] Yes [x] No    If the office needs to give you a call back, can they leave a voicemail: [] Yes [x] No    Alison Sanford Rep   04/19/24 11:17 EDT

## 2024-05-07 RX ORDER — CARVEDILOL 25 MG/1
25 TABLET ORAL 2 TIMES DAILY WITH MEALS
Qty: 60 TABLET | Refills: 0 | Status: CANCELLED | OUTPATIENT
Start: 2024-05-07 | End: 2024-06-06

## 2024-05-09 ENCOUNTER — OFFICE VISIT (OUTPATIENT)
Dept: FAMILY MEDICINE CLINIC | Facility: CLINIC | Age: 54
End: 2024-05-09
Payer: COMMERCIAL

## 2024-05-09 VITALS
SYSTOLIC BLOOD PRESSURE: 130 MMHG | HEIGHT: 74 IN | HEART RATE: 81 BPM | TEMPERATURE: 98.7 F | OXYGEN SATURATION: 96 % | WEIGHT: 211.4 LBS | BODY MASS INDEX: 27.13 KG/M2 | DIASTOLIC BLOOD PRESSURE: 80 MMHG

## 2024-05-09 DIAGNOSIS — Z01.30 BLOOD PRESSURE CHECK: ICD-10-CM

## 2024-05-09 DIAGNOSIS — I10 PRIMARY HYPERTENSION: Primary | ICD-10-CM

## 2024-05-09 PROCEDURE — 99213 OFFICE O/P EST LOW 20 MIN: CPT

## 2024-05-09 RX ORDER — CARVEDILOL 25 MG/1
25 TABLET ORAL 2 TIMES DAILY WITH MEALS
Qty: 60 TABLET | Refills: 2 | Status: SHIPPED | OUTPATIENT
Start: 2024-05-09 | End: 2024-08-07

## 2024-07-08 DIAGNOSIS — I10 PRIMARY HYPERTENSION: ICD-10-CM

## 2024-07-08 RX ORDER — AMLODIPINE BESYLATE 5 MG/1
10 TABLET ORAL DAILY
Qty: 60 TABLET | Refills: 2 | Status: SHIPPED | OUTPATIENT
Start: 2024-07-08

## 2024-07-24 RX ORDER — FUROSEMIDE 40 MG/1
40 TABLET ORAL DAILY
Qty: 30 TABLET | Refills: 0 | Status: SHIPPED | OUTPATIENT
Start: 2024-07-24 | End: 2024-08-23

## 2024-09-18 RX ORDER — LISINOPRIL 40 MG/1
40 TABLET ORAL DAILY
Qty: 30 TABLET | Refills: 3 | Status: SHIPPED | OUTPATIENT
Start: 2024-09-18 | End: 2025-01-16

## 2024-10-07 DIAGNOSIS — Z01.30 BLOOD PRESSURE CHECK: ICD-10-CM

## 2024-10-07 DIAGNOSIS — I10 PRIMARY HYPERTENSION: ICD-10-CM

## 2024-10-07 RX ORDER — CARVEDILOL 25 MG/1
25 TABLET ORAL 2 TIMES DAILY WITH MEALS
Qty: 60 TABLET | Refills: 2 | Status: SHIPPED | OUTPATIENT
Start: 2024-10-07

## 2024-10-21 DIAGNOSIS — I10 PRIMARY HYPERTENSION: ICD-10-CM

## 2024-10-21 RX ORDER — AMLODIPINE BESYLATE 5 MG/1
10 TABLET ORAL DAILY
Qty: 60 TABLET | Refills: 2 | Status: SHIPPED | OUTPATIENT
Start: 2024-10-21

## 2024-12-11 ENCOUNTER — OFFICE VISIT (OUTPATIENT)
Dept: FAMILY MEDICINE CLINIC | Facility: CLINIC | Age: 54
End: 2024-12-11
Payer: COMMERCIAL

## 2024-12-11 VITALS
TEMPERATURE: 97.5 F | BODY MASS INDEX: 26.99 KG/M2 | RESPIRATION RATE: 16 BRPM | SYSTOLIC BLOOD PRESSURE: 160 MMHG | DIASTOLIC BLOOD PRESSURE: 80 MMHG | OXYGEN SATURATION: 98 % | HEART RATE: 73 BPM | HEIGHT: 74 IN | WEIGHT: 210.3 LBS

## 2024-12-11 DIAGNOSIS — Z12.2 SCREENING FOR LUNG CANCER: ICD-10-CM

## 2024-12-11 DIAGNOSIS — R35.1 NOCTURIA: ICD-10-CM

## 2024-12-11 DIAGNOSIS — Z12.5 PROSTATE CANCER SCREENING: ICD-10-CM

## 2024-12-11 DIAGNOSIS — Z72.0 TOBACCO USE: ICD-10-CM

## 2024-12-11 DIAGNOSIS — Z00.00 ANNUAL PHYSICAL EXAM: Primary | ICD-10-CM

## 2024-12-11 DIAGNOSIS — E78.5 HYPERLIPIDEMIA, UNSPECIFIED HYPERLIPIDEMIA TYPE: Chronic | ICD-10-CM

## 2024-12-11 DIAGNOSIS — I10 PRIMARY HYPERTENSION: Chronic | ICD-10-CM

## 2024-12-11 DIAGNOSIS — R39.16 BENIGN PROSTATIC HYPERPLASIA (BPH) WITH STRAINING ON URINATION: ICD-10-CM

## 2024-12-11 DIAGNOSIS — N40.1 BENIGN PROSTATIC HYPERPLASIA (BPH) WITH STRAINING ON URINATION: ICD-10-CM

## 2024-12-11 DIAGNOSIS — I50.42 CHRONIC COMBINED SYSTOLIC AND DIASTOLIC CONGESTIVE HEART FAILURE: Chronic | ICD-10-CM

## 2024-12-11 PROBLEM — I50.40 COMBINED SYSTOLIC AND DIASTOLIC CONGESTIVE HEART FAILURE: Status: ACTIVE | Noted: 2020-06-05

## 2024-12-11 PROCEDURE — 99214 OFFICE O/P EST MOD 30 MIN: CPT | Performed by: STUDENT IN AN ORGANIZED HEALTH CARE EDUCATION/TRAINING PROGRAM

## 2024-12-11 PROCEDURE — 99396 PREV VISIT EST AGE 40-64: CPT | Performed by: STUDENT IN AN ORGANIZED HEALTH CARE EDUCATION/TRAINING PROGRAM

## 2024-12-11 RX ORDER — TAMSULOSIN HYDROCHLORIDE 0.4 MG/1
1 CAPSULE ORAL DAILY
Qty: 30 CAPSULE | Refills: 3 | Status: SHIPPED | OUTPATIENT
Start: 2024-12-11

## 2024-12-11 RX ORDER — LISINOPRIL 40 MG/1
40 TABLET ORAL DAILY
Qty: 90 TABLET | Refills: 1 | Status: SHIPPED | OUTPATIENT
Start: 2024-12-11

## 2024-12-11 RX ORDER — AMLODIPINE BESYLATE 10 MG/1
10 TABLET ORAL DAILY
Qty: 90 TABLET | Refills: 1 | Status: SHIPPED | OUTPATIENT
Start: 2024-12-11

## 2024-12-11 RX ORDER — ATORVASTATIN CALCIUM 10 MG/1
10 TABLET, FILM COATED ORAL NIGHTLY
Qty: 90 TABLET | Refills: 1 | Status: SHIPPED | OUTPATIENT
Start: 2024-12-11

## 2024-12-11 RX ORDER — ASPIRIN 81 MG/1
81 TABLET ORAL DAILY
Qty: 90 TABLET | Refills: 1 | Status: SHIPPED | OUTPATIENT
Start: 2024-12-11

## 2024-12-11 RX ORDER — CARVEDILOL 25 MG/1
25 TABLET ORAL 2 TIMES DAILY WITH MEALS
Qty: 180 TABLET | Refills: 1 | Status: SHIPPED | OUTPATIENT
Start: 2024-12-11

## 2024-12-11 NOTE — ASSESSMENT & PLAN NOTE
Hypertension is stable and controlled  Continue current treatment regimen.  Dietary sodium restriction.  Weight loss.  Ambulatory blood pressure monitoring.  Blood pressure will be reassessed in 1 month.  Discussed the importance of healthy diet, nutrition, and lifestyle. Recommend low salt, fat/cholesterol diet and avoid concentrated sweets. Encouraged DASH diet along with fresh fruits & vegetables and low fat dairy products. Counseled patient to exercise/walk as tolerated. Avoid tobacco and alcohol use.

## 2024-12-11 NOTE — ASSESSMENT & PLAN NOTE
Counseled patient on tobacco cessation.  Patient agreeable to low-dose chest CT for cancer screening.

## 2024-12-11 NOTE — ASSESSMENT & PLAN NOTE
Lipid abnormalities are  instructed patient to return to clinic for fasting lipid panel.    Plan:  Continue same medication/s without change.      Discussed medication dosage, use, side effects, and goals of treatment in detail.    Counseled patient on lifestyle modifications to help control hyperlipidemia.     Patient Treatment Goals:   LDL goal is less than 70    Followup in 6 months.  Discussed the importance of healthy diet, nutrition, and lifestyle. Recommend low salt, fat/cholesterol diet and avoid concentrated sweets. Encouraged DASH diet along with fresh fruits & vegetables and low fat dairy products. Counseled patient to exercise/walk as tolerated. Avoid tobacco and alcohol use.

## 2024-12-11 NOTE — PROGRESS NOTES
"Chief Complaint  Annual Exam, Hypertension (Only taken Lisinopril, Aspirin and Carvedilol), and Med Management (Refill Lasix)    Subjective        Marlo Merrill presents to [facility]  History of Present Illness  54-year-old -American male who usually follows Nurse Practitioner Asa Rodriguez here for chronic disease management follow-up visit and annual wellness visit..    Pt reports he is her for a physical and wants to have his prostate evaluated.  No Fmhx of cancers.    Pt c/o nocturia about 3 times x 1 mo and has to force urine out. However pt drinks juice, water and coffee after dinner.    Pt states cutting back on TOB use.  Pt reports TOB use hx 20 year x 2 PPD.  Pt reports out of amlodipine for 2 weeks. Denied any CP/SOA.  Patient reports he had eaten prior to clinic arrival, instructed patient to return to clinic fasting for fasting lipid panel on follow-up visit.  Discussed routine preventive vaccines that are due at this time.  Hypertension        Objective   Vital Signs:  /80 (BP Location: Left arm, Patient Position: Sitting, Cuff Size: Large Adult)   Pulse 73   Temp 97.5 °F (36.4 °C) (Temporal)   Resp 16   Ht 188 cm (74.02\")   Wt 95.4 kg (210 lb 4.8 oz)   SpO2 98%   BMI 26.99 kg/m²   Estimated body mass index is 26.99 kg/m² as calculated from the following:    Height as of this encounter: 188 cm (74.02\").    Weight as of this encounter: 95.4 kg (210 lb 4.8 oz).               Physical Exam  Constitutional:       Appearance: Normal appearance.   HENT:      Head: Normocephalic and atraumatic.   Eyes:      Conjunctiva/sclera: Conjunctivae normal.   Cardiovascular:      Rate and Rhythm: Normal rate and regular rhythm.      Heart sounds: Normal heart sounds.   Pulmonary:      Effort: Pulmonary effort is normal.      Breath sounds: Normal breath sounds.   Abdominal:      General: Bowel sounds are normal.      Palpations: Abdomen is soft.      Comments: " Non-tender   Skin:     General: Skin is warm.   Neurological:      General: No focal deficit present.      Mental Status: He is alert and oriented to person, place, and time.   Psychiatric:         Mood and Affect: Mood normal.         Behavior: Behavior normal.        Result Review :    The following data was reviewed by: WON Whiting on 12/11/2024:  CMP          3/15/2024    14:33   CMP   Glucose 119    BUN 12    Creatinine 1.16    EGFR 74.8    Sodium 142    Potassium 3.7    Chloride 108    Calcium 9.1    Total Protein 7.5    Albumin 4.6    Globulin 2.9    Total Bilirubin 0.4    Alkaline Phosphatase 80    AST (SGOT) 18    ALT (SGPT) 26    Albumin/Globulin Ratio 1.6    BUN/Creatinine Ratio 10.3    Anion Gap 10.0      CBC          3/15/2024    14:33   CBC   WBC 7.16    RBC 5.43    Hemoglobin 15.2    Hematocrit 45.9    MCV 84.5    MCH 28.0    MCHC 33.1    RDW 13.7    Platelets 324                             Assessment and Plan     Diagnoses and all orders for this visit:    1. Annual physical exam (Primary)  Assessment & Plan:  Provided patient the sheet with information regarding [facility] for psych symptoms.   Instructed patient to contact Hickman Velazquez by calling Tel #  [phone] for appointment and further information.        2. Nocturia  -     Hemoglobin A1c    3. Benign prostatic hyperplasia (BPH) with straining on urination  Assessment & Plan:  Started Flomax today, patient was understanding.    Orders:  -     tamsulosin (FLOMAX) 0.4 MG capsule 24 hr capsule; Take 1 capsule by mouth Daily.  Dispense: 30 capsule; Refill: 3    4. Primary hypertension  Assessment & Plan:  Hypertension is stable and controlled  Continue current treatment regimen.  Dietary sodium restriction.  Weight loss.  Ambulatory blood pressure monitoring.  Blood pressure will be reassessed in 1 month.  Discussed the importance of healthy diet, nutrition, and lifestyle. Recommend  low salt, fat/cholesterol diet and avoid concentrated sweets. Encouraged DASH diet along with fresh fruits & vegetables and low fat dairy products. Counseled patient to exercise/walk as tolerated. Avoid tobacco and alcohol use.      Orders:  -     CBC & Differential  -     Comprehensive Metabolic Panel  -     Urinalysis With Microscopic - Urine, Clean Catch  -     TSH Rfx On Abnormal To Free T4  -     amLODIPine (NORVASC) 10 MG tablet; Take 1 tablet by mouth Daily.  Dispense: 90 tablet; Refill: 1  -     Ambulatory Referral to Cardiology  -     carvedilol (COREG) 25 MG tablet; Take 1 tablet by mouth 2 (Two) Times a Day With Meals.  Dispense: 180 tablet; Refill: 1  -     lisinopril (PRINIVIL,ZESTRIL) 40 MG tablet; Take 1 tablet by mouth Daily.  Dispense: 90 tablet; Refill: 1    5. Hyperlipidemia, unspecified hyperlipidemia type  Assessment & Plan:   Lipid abnormalities are  instructed patient to return to clinic for fasting lipid panel.    Plan:  Continue same medication/s without change.      Discussed medication dosage, use, side effects, and goals of treatment in detail.    Counseled patient on lifestyle modifications to help control hyperlipidemia.     Patient Treatment Goals:   LDL goal is less than 70    Followup in 6 months.  Discussed the importance of healthy diet, nutrition, and lifestyle. Recommend low salt, fat/cholesterol diet and avoid concentrated sweets. Encouraged DASH diet along with fresh fruits & vegetables and low fat dairy products. Counseled patient to exercise/walk as tolerated. Avoid tobacco and alcohol use.      Orders:  -     atorvastatin (LIPITOR) 10 MG tablet; Take 1 tablet by mouth Every Night.  Dispense: 90 tablet; Refill: 1    6. Prostate cancer screening  -     PSA Screen    7. Tobacco use  -      CT Chest Low Dose Cancer Screening WO; Future    8. Screening for lung cancer  Assessment & Plan:  Counseled patient on tobacco cessation.  Patient agreeable to low-dose chest CT for cancer  screening.    Orders:  -      CT Chest Low Dose Cancer Screening WO; Future    9. Chronic combined systolic and diastolic congestive heart failure  Assessment & Plan:  Discussed cardiology follow-up, patient was understanding.      Orders:  -     Ambulatory Referral to Cardiology    Other orders  -     aspirin 81 MG EC tablet; Take 1 tablet by mouth Daily.  Dispense: 90 tablet; Refill: 1        All chronic conditions have been addressed and treated by the practice or other specialists. Medications have been reconciled and refilled as appropriate. Reiterated compliance and timely follow up appointments. Side effects of all new and old medications reviewed with the patient and patient willing to accept all risks involved. Advised RTO if no improvement or worsening of symptoms or if any new complaints arise. Patient advised to follow up with clinic or call after diagnostic tests, if patient does not hear from office 3 days after the test completion.          Follow Up     Return in about 4 weeks (around 1/8/2025) for Next scheduled follow up.  Patient was given instructions and counseling regarding his condition or for health maintenance advice. Please see specific information pulled into the AVS if appropriate.

## 2024-12-11 NOTE — ASSESSMENT & PLAN NOTE
Provided patient the sheet with information regarding [facility] for psych symptoms.   Instructed patient to contact Manuela Velazquez by calling Tel #  [phone] for appointment and further information.

## 2024-12-12 LAB
ALBUMIN SERPL-MCNC: 4.4 G/DL (ref 3.8–4.9)
ALP SERPL-CCNC: 90 IU/L (ref 44–121)
ALT SERPL-CCNC: 20 IU/L (ref 0–44)
APPEARANCE UR: CLEAR
AST SERPL-CCNC: 14 IU/L (ref 0–40)
BACTERIA #/AREA URNS HPF: ABNORMAL /[HPF]
BASOPHILS # BLD AUTO: 0 X10E3/UL (ref 0–0.2)
BASOPHILS NFR BLD AUTO: 1 %
BILIRUB SERPL-MCNC: 0.3 MG/DL (ref 0–1.2)
BILIRUB UR QL STRIP: NEGATIVE
BUN SERPL-MCNC: 8 MG/DL (ref 6–24)
BUN/CREAT SERPL: 8 (ref 9–20)
CALCIUM SERPL-MCNC: 9.9 MG/DL (ref 8.7–10.2)
CASTS URNS QL MICRO: ABNORMAL /LPF
CHLORIDE SERPL-SCNC: 106 MMOL/L (ref 96–106)
CO2 SERPL-SCNC: 25 MMOL/L (ref 20–29)
COLOR UR: YELLOW
CREAT SERPL-MCNC: 1.06 MG/DL (ref 0.76–1.27)
CRYSTALS URNS MICRO: ABNORMAL
EGFRCR SERPLBLD CKD-EPI 2021: 83 ML/MIN/1.73
EOSINOPHIL # BLD AUTO: 0.1 X10E3/UL (ref 0–0.4)
EOSINOPHIL NFR BLD AUTO: 1 %
EPI CELLS #/AREA URNS HPF: ABNORMAL /HPF (ref 0–10)
ERYTHROCYTE [DISTWIDTH] IN BLOOD BY AUTOMATED COUNT: 13.9 % (ref 11.6–15.4)
GLOBULIN SER CALC-MCNC: 3.1 G/DL (ref 1.5–4.5)
GLUCOSE SERPL-MCNC: 101 MG/DL (ref 70–99)
GLUCOSE UR QL STRIP: NEGATIVE
HBA1C MFR BLD: 5.7 % (ref 4.8–5.6)
HCT VFR BLD AUTO: 47.2 % (ref 37.5–51)
HGB BLD-MCNC: 15 G/DL (ref 13–17.7)
HGB UR QL STRIP: NEGATIVE
IMM GRANULOCYTES # BLD AUTO: 0 X10E3/UL (ref 0–0.1)
IMM GRANULOCYTES NFR BLD AUTO: 0 %
KETONES UR QL STRIP: ABNORMAL
LEUKOCYTE ESTERASE UR QL STRIP: NEGATIVE
LYMPHOCYTES # BLD AUTO: 2.9 X10E3/UL (ref 0.7–3.1)
LYMPHOCYTES NFR BLD AUTO: 38 %
MCH RBC QN AUTO: 27.9 PG (ref 26.6–33)
MCHC RBC AUTO-ENTMCNC: 31.8 G/DL (ref 31.5–35.7)
MCV RBC AUTO: 88 FL (ref 79–97)
MICRO URNS: ABNORMAL
MONOCYTES # BLD AUTO: 0.7 X10E3/UL (ref 0.1–0.9)
MONOCYTES NFR BLD AUTO: 9 %
MUCOUS THREADS URNS QL MICRO: PRESENT
NEUTROPHILS # BLD AUTO: 3.9 X10E3/UL (ref 1.4–7)
NEUTROPHILS NFR BLD AUTO: 51 %
NITRITE UR QL STRIP: NEGATIVE
PH UR STRIP: 6 [PH] (ref 5–7.5)
PLATELET # BLD AUTO: 404 X10E3/UL (ref 150–450)
POTASSIUM SERPL-SCNC: 4.2 MMOL/L (ref 3.5–5.2)
PROT SERPL-MCNC: 7.5 G/DL (ref 6–8.5)
PROT UR QL STRIP: ABNORMAL
PSA SERPL-MCNC: 0.5 NG/ML (ref 0–4)
RBC # BLD AUTO: 5.37 X10E6/UL (ref 4.14–5.8)
RBC #/AREA URNS HPF: ABNORMAL /HPF (ref 0–2)
SODIUM SERPL-SCNC: 143 MMOL/L (ref 134–144)
SP GR UR STRIP: >=1.03 (ref 1–1.03)
TSH SERPL DL<=0.005 MIU/L-ACNC: 0.68 UIU/ML (ref 0.45–4.5)
UNIDENT CRYS URNS QL MICRO: PRESENT
UROBILINOGEN UR STRIP-MCNC: 1 MG/DL (ref 0.2–1)
WBC # BLD AUTO: 7.6 X10E3/UL (ref 3.4–10.8)
WBC #/AREA URNS HPF: ABNORMAL /HPF (ref 0–5)

## 2024-12-12 RX ORDER — FUROSEMIDE 40 MG/1
40 TABLET ORAL DAILY
Qty: 30 TABLET | Refills: 0 | Status: SHIPPED | OUTPATIENT
Start: 2024-12-12

## 2024-12-13 DIAGNOSIS — R80.9 PROTEINURIA, UNSPECIFIED TYPE: Primary | ICD-10-CM

## 2024-12-13 DIAGNOSIS — R82.998 CALCIUM OXALATE CRYSTALS IN URINE: ICD-10-CM

## 2025-01-16 ENCOUNTER — HOSPITAL ENCOUNTER (OUTPATIENT)
Dept: CT IMAGING | Facility: HOSPITAL | Age: 55
End: 2025-01-16
Payer: COMMERCIAL

## 2025-03-20 DIAGNOSIS — I10 PRIMARY HYPERTENSION: Chronic | ICD-10-CM

## 2025-03-20 RX ORDER — AMLODIPINE BESYLATE 10 MG/1
10 TABLET ORAL DAILY
Qty: 90 TABLET | Refills: 1 | Status: SHIPPED | OUTPATIENT
Start: 2025-03-20

## 2025-06-13 RX ORDER — ASPIRIN 81 MG/1
81 TABLET, COATED ORAL DAILY
Qty: 90 TABLET | Refills: 0 | Status: SHIPPED | OUTPATIENT
Start: 2025-06-13

## 2025-06-30 ENCOUNTER — OFFICE VISIT (OUTPATIENT)
Dept: FAMILY MEDICINE CLINIC | Facility: CLINIC | Age: 55
End: 2025-06-30
Payer: COMMERCIAL

## 2025-06-30 VITALS
DIASTOLIC BLOOD PRESSURE: 82 MMHG | OXYGEN SATURATION: 98 % | HEART RATE: 62 BPM | BODY MASS INDEX: 27.57 KG/M2 | HEIGHT: 74 IN | WEIGHT: 214.8 LBS | SYSTOLIC BLOOD PRESSURE: 154 MMHG | TEMPERATURE: 98.4 F

## 2025-06-30 DIAGNOSIS — Z09 FOLLOW-UP EXAM: Primary | ICD-10-CM

## 2025-06-30 DIAGNOSIS — I10 PRIMARY HYPERTENSION: Chronic | ICD-10-CM

## 2025-06-30 DIAGNOSIS — E78.5 HYPERLIPIDEMIA, UNSPECIFIED HYPERLIPIDEMIA TYPE: Chronic | ICD-10-CM

## 2025-06-30 DIAGNOSIS — L24.5 IRRITANT CONTACT DERMATITIS DUE TO OTHER CHEMICAL PRODUCTS: ICD-10-CM

## 2025-06-30 DIAGNOSIS — R73.03 PREDIABETES: ICD-10-CM

## 2025-06-30 DIAGNOSIS — Z12.11 SCREEN FOR COLON CANCER: ICD-10-CM

## 2025-06-30 PROCEDURE — 99214 OFFICE O/P EST MOD 30 MIN: CPT

## 2025-06-30 RX ORDER — ATORVASTATIN CALCIUM 10 MG/1
10 TABLET, FILM COATED ORAL NIGHTLY
Qty: 90 TABLET | Refills: 1 | Status: SHIPPED | OUTPATIENT
Start: 2025-06-30

## 2025-06-30 RX ORDER — FUROSEMIDE 40 MG/1
40 TABLET ORAL DAILY
Qty: 90 TABLET | Refills: 1 | Status: SHIPPED | OUTPATIENT
Start: 2025-06-30

## 2025-06-30 RX ORDER — ASPIRIN 81 MG/1
81 TABLET ORAL DAILY
Qty: 90 TABLET | Refills: 1 | Status: SHIPPED | OUTPATIENT
Start: 2025-06-30

## 2025-06-30 RX ORDER — AMLODIPINE BESYLATE 10 MG/1
10 TABLET ORAL DAILY
Qty: 90 TABLET | Refills: 1 | Status: SHIPPED | OUTPATIENT
Start: 2025-06-30

## 2025-06-30 NOTE — PROGRESS NOTES
"Chief Complaint  Hypertension (Not fasting/) and Med Refill (Discuss need for Lasix/)    Subjective        Marlo Merrill presents to [facility]    History of Present Illness  55 year old male presents for follow up. Hypertension is uncontrolled in clinic today at 164/102. Denies chest pain and shortness of breath. Denies headache, blurry vision, light headedness or dizziness. He has taken carvedilol and lisinopril. Recheck /82.  Encourage patient to make appointment and follow-up with his cardiologist.  He is currently on atorvastatin for lipid control.  Most previous hemoglobin A1c in prediabetic range at 5.7%. Due for labs today. He had episode of irritant dermatitis from laundry detergent. Reports he uses topical hydrocortisone cream as needed for symptoms. Denies family history of colon cancer.  Agreeable Cologuard testing. Educated he may receive COVID and Shingles vaccinations at his pharmacy.       Objective   Vital Signs:  /82 (BP Location: Right arm, Patient Position: Sitting, Cuff Size: Adult)   Pulse 62   Temp 98.4 °F (36.9 °C)   Ht 188 cm (74.02\")   Wt 97.4 kg (214 lb 12.8 oz)   SpO2 98%   BMI 27.57 kg/m²   Estimated body mass index is 27.57 kg/m² as calculated from the following:    Height as of this encounter: 188 cm (74.02\").    Weight as of this encounter: 97.4 kg (214 lb 12.8 oz).          Physical Exam  Constitutional:       Appearance: Normal appearance.   HENT:      Head: Normocephalic.   Eyes:      Conjunctiva/sclera: Conjunctivae normal.      Pupils: Pupils are equal, round, and reactive to light.   Cardiovascular:      Rate and Rhythm: Normal rate and regular rhythm.      Pulses: Normal pulses.   Pulmonary:      Effort: Pulmonary effort is normal.      Breath sounds: Normal breath sounds.   Musculoskeletal:         General: Normal range of motion.   Skin:     General: Skin is warm.   Neurological:      General: No focal deficit present.      " Mental Status: He is alert and oriented to person, place, and time.   Psychiatric:         Mood and Affect: Mood normal.         Behavior: Behavior normal.        Result Review :  The following data was reviewed by: WON Reed on 06/30/2025:  Common labs          12/11/2024    14:21   Common Labs   Glucose 101    BUN 8    Creatinine 1.06    Sodium 143    Potassium 4.2    Chloride 106    Calcium 9.9    Albumin 4.4    Total Bilirubin 0.3    Alkaline Phosphatase 90    AST (SGOT) 14    ALT (SGPT) 20    WBC 7.6    Hemoglobin 15.0    Hematocrit 47.2    Platelets 404    Hemoglobin A1C 5.7    PSA 0.5      Data reviewed : labs           Assessment and Plan   Diagnoses and all orders for this visit:    1. Follow-up exam (Primary)    2. Primary hypertension  Assessment & Plan:  Hypertension is borderline  Continue current treatment regimen.  Dietary sodium restriction.  Weight loss.  Regular aerobic exercise.  Ambulatory blood pressure monitoring.  Blood pressure will be reassessedin 2 weeks.    Orders:  -     amLODIPine (NORVASC) 10 MG tablet; Take 1 tablet by mouth Daily.  Dispense: 90 tablet; Refill: 1  -     furosemide (LASIX) 40 MG tablet; Take 1 tablet by mouth Daily.  Dispense: 90 tablet; Refill: 1    3. Hyperlipidemia, unspecified hyperlipidemia type  -     aspirin (Aspirin Low Dose) 81 MG EC tablet; Take 1 tablet by mouth Daily.  Dispense: 90 tablet; Refill: 1  -     atorvastatin (LIPITOR) 10 MG tablet; Take 1 tablet by mouth Every Night.  Dispense: 90 tablet; Refill: 1  -     Comprehensive metabolic panel  -     Lipid panel    4. Prediabetes  -     Comprehensive metabolic panel  -     Hemoglobin A1c    5. Irritant contact dermatitis due to other chemical products  Comments:  Continue to use topical steroid as needed for symptoms.  Return to clinic for repeat episode.    6. Screen for colon cancer  -     Cologuard - Stool, Per Rectum; Future             Follow Up   Return in about 2 weeks (around 7/14/2025)  for Recheck BP.  Patient was given instructions and counseling regarding his condition or for health maintenance advice. Please see specific information pulled into the AVS if appropriate.

## 2025-06-30 NOTE — ASSESSMENT & PLAN NOTE
Hypertension is borderline  Continue current treatment regimen.  Dietary sodium restriction.  Weight loss.  Regular aerobic exercise.  Ambulatory blood pressure monitoring.  Blood pressure will be reassessedin 2 weeks.

## 2025-07-01 ENCOUNTER — RESULTS FOLLOW-UP (OUTPATIENT)
Dept: FAMILY MEDICINE CLINIC | Facility: CLINIC | Age: 55
End: 2025-07-01
Payer: COMMERCIAL

## 2025-07-01 LAB
ALBUMIN SERPL-MCNC: 4.5 G/DL (ref 3.8–4.9)
ALP SERPL-CCNC: 89 IU/L (ref 44–121)
ALT SERPL-CCNC: 20 IU/L (ref 0–44)
AST SERPL-CCNC: 12 IU/L (ref 0–40)
BILIRUB SERPL-MCNC: 0.2 MG/DL (ref 0–1.2)
BUN SERPL-MCNC: 9 MG/DL (ref 6–24)
BUN/CREAT SERPL: 9 (ref 9–20)
CALCIUM SERPL-MCNC: 9.9 MG/DL (ref 8.7–10.2)
CHLORIDE SERPL-SCNC: 107 MMOL/L (ref 96–106)
CHOLEST SERPL-MCNC: 209 MG/DL (ref 100–199)
CO2 SERPL-SCNC: 25 MMOL/L (ref 20–29)
CREAT SERPL-MCNC: 1.03 MG/DL (ref 0.76–1.27)
EGFRCR SERPLBLD CKD-EPI 2021: 85 ML/MIN/1.73
GLOBULIN SER CALC-MCNC: 2.6 G/DL (ref 1.5–4.5)
GLUCOSE SERPL-MCNC: 81 MG/DL (ref 70–99)
HBA1C MFR BLD: 5.7 % (ref 4.8–5.6)
HDLC SERPL-MCNC: 43 MG/DL
LDLC SERPL CALC-MCNC: 148 MG/DL (ref 0–99)
POTASSIUM SERPL-SCNC: 4.5 MMOL/L (ref 3.5–5.2)
PROT SERPL-MCNC: 7.1 G/DL (ref 6–8.5)
SODIUM SERPL-SCNC: 145 MMOL/L (ref 134–144)
TRIGL SERPL-MCNC: 101 MG/DL (ref 0–149)
VLDLC SERPL CALC-MCNC: 18 MG/DL (ref 5–40)

## 2025-07-09 DIAGNOSIS — I10 PRIMARY HYPERTENSION: Chronic | ICD-10-CM

## 2025-07-10 ENCOUNTER — TELEPHONE (OUTPATIENT)
Dept: FAMILY MEDICINE CLINIC | Facility: CLINIC | Age: 55
End: 2025-07-10
Payer: COMMERCIAL

## 2025-07-10 DIAGNOSIS — I10 PRIMARY HYPERTENSION: Chronic | ICD-10-CM

## 2025-07-10 RX ORDER — LISINOPRIL 40 MG/1
40 TABLET ORAL DAILY
Qty: 90 TABLET | Refills: 0 | Status: SHIPPED | OUTPATIENT
Start: 2025-07-10

## 2025-07-10 RX ORDER — LISINOPRIL 40 MG/1
40 TABLET ORAL DAILY
Qty: 90 TABLET | Refills: 1 | OUTPATIENT
Start: 2025-07-10

## 2025-07-10 RX ORDER — LISINOPRIL 40 MG/1
40 TABLET ORAL DAILY
Qty: 90 TABLET | Refills: 0 | Status: SHIPPED | OUTPATIENT
Start: 2025-07-10 | End: 2025-07-10 | Stop reason: SDUPTHER

## 2025-07-10 NOTE — TELEPHONE ENCOUNTER
Caller: Marlo Merrill    Relationship: Self    Best call back number: [phone]     What medication are you requesting: LISINORPIL     If a prescription is needed, what is your preferred pharmacy and phone number: [facility] [id number] - [place], [place] - [facility] AT [facility] & ([address]) - [phone] [facility] [phone] FX     Additional notes:    PATIENT IS CALLING TO HAVE THIS SENT TO [facility]. IT WAS SENT TO THE WRONG PHARMACY